# Patient Record
Sex: MALE | Race: WHITE | NOT HISPANIC OR LATINO | Employment: OTHER | URBAN - METROPOLITAN AREA
[De-identification: names, ages, dates, MRNs, and addresses within clinical notes are randomized per-mention and may not be internally consistent; named-entity substitution may affect disease eponyms.]

---

## 2017-12-21 ENCOUNTER — TRANSCRIBE ORDERS (OUTPATIENT)
Dept: ADMINISTRATIVE | Facility: HOSPITAL | Age: 52
End: 2017-12-21

## 2017-12-21 DIAGNOSIS — K74.60 CIRRHOSIS OF LIVER WITHOUT ASCITES, UNSPECIFIED HEPATIC CIRRHOSIS TYPE (HCC): ICD-10-CM

## 2017-12-21 DIAGNOSIS — R16.0 HEPATOMEGALY: Primary | ICD-10-CM

## 2019-04-04 ENCOUNTER — HOSPITAL ENCOUNTER (EMERGENCY)
Facility: HOSPITAL | Age: 54
Discharge: HOME | End: 2019-04-04
Attending: EMERGENCY MEDICINE | Admitting: EMERGENCY MEDICINE
Payer: COMMERCIAL

## 2019-04-04 ENCOUNTER — APPOINTMENT (EMERGENCY)
Dept: RADIOLOGY | Facility: HOSPITAL | Age: 54
End: 2019-04-04
Payer: COMMERCIAL

## 2019-04-04 VITALS
HEART RATE: 99 BPM | BODY MASS INDEX: 25.77 KG/M2 | RESPIRATION RATE: 20 BRPM | HEIGHT: 70 IN | WEIGHT: 180 LBS | TEMPERATURE: 97.8 F | OXYGEN SATURATION: 98 % | DIASTOLIC BLOOD PRESSURE: 82 MMHG | SYSTOLIC BLOOD PRESSURE: 117 MMHG

## 2019-04-04 DIAGNOSIS — S40.011A CONTUSION OF RIGHT SHOULDER, INITIAL ENCOUNTER: Primary | ICD-10-CM

## 2019-04-04 PROCEDURE — 99283 EMERGENCY DEPT VISIT LOW MDM: CPT | Performed by: PHYSICIAN ASSISTANT

## 2019-04-04 PROCEDURE — 99283 EMERGENCY DEPT VISIT LOW MDM: CPT

## 2019-04-04 PROCEDURE — 73030 X-RAY EXAM OF SHOULDER: CPT

## 2019-04-04 PROCEDURE — 96372 THER/PROPH/DIAG INJ SC/IM: CPT

## 2019-04-04 RX ORDER — IBUPROFEN 600 MG/1
600 TABLET ORAL EVERY 6 HOURS PRN
Qty: 30 TABLET | Refills: 0 | Status: SHIPPED | OUTPATIENT
Start: 2019-04-04 | End: 2019-09-10 | Stop reason: ALTCHOICE

## 2019-04-04 RX ORDER — KETOROLAC TROMETHAMINE 30 MG/ML
15 INJECTION, SOLUTION INTRAMUSCULAR; INTRAVENOUS ONCE
Status: COMPLETED | OUTPATIENT
Start: 2019-04-04 | End: 2019-04-04

## 2019-04-04 RX ADMIN — KETOROLAC TROMETHAMINE 15 MG: 30 INJECTION, SOLUTION INTRAMUSCULAR; INTRAVENOUS at 13:06

## 2019-09-10 ENCOUNTER — HOSPITAL ENCOUNTER (EMERGENCY)
Facility: HOSPITAL | Age: 54
Discharge: HOME | End: 2019-09-10
Attending: EMERGENCY MEDICINE | Admitting: EMERGENCY MEDICINE
Payer: COMMERCIAL

## 2019-09-10 ENCOUNTER — APPOINTMENT (EMERGENCY)
Dept: RADIOLOGY | Facility: HOSPITAL | Age: 54
End: 2019-09-10
Payer: COMMERCIAL

## 2019-09-10 VITALS
TEMPERATURE: 97.6 F | OXYGEN SATURATION: 99 % | HEART RATE: 95 BPM | WEIGHT: 150 LBS | DIASTOLIC BLOOD PRESSURE: 77 MMHG | SYSTOLIC BLOOD PRESSURE: 122 MMHG | BODY MASS INDEX: 21.52 KG/M2 | RESPIRATION RATE: 15 BRPM

## 2019-09-10 DIAGNOSIS — R00.0 TACHYCARDIA: ICD-10-CM

## 2019-09-10 DIAGNOSIS — R06.00 DYSPNEA: Primary | ICD-10-CM

## 2019-09-10 DIAGNOSIS — M79.10 MYALGIA: ICD-10-CM

## 2019-09-10 DIAGNOSIS — F19.10 SUBSTANCE ABUSE (HCC): ICD-10-CM

## 2019-09-10 LAB
ALBUMIN SERPL BCP-MCNC: 4.2 G/DL (ref 3.5–5)
ALP SERPL-CCNC: 109 U/L (ref 46–116)
ALT SERPL W P-5'-P-CCNC: 35 U/L (ref 12–78)
ANION GAP SERPL CALCULATED.3IONS-SCNC: 8 MMOL/L (ref 4–13)
APTT PPP: 32 SECONDS (ref 23–37)
AST SERPL W P-5'-P-CCNC: 38 U/L (ref 5–45)
ATRIAL RATE: 106 BPM
BASOPHILS # BLD AUTO: 0.03 THOUSANDS/ΜL (ref 0–0.1)
BASOPHILS NFR BLD AUTO: 1 % (ref 0–1)
BILIRUB SERPL-MCNC: 1.4 MG/DL (ref 0.2–1)
BUN SERPL-MCNC: 17 MG/DL (ref 5–25)
CALCIUM SERPL-MCNC: 9.4 MG/DL (ref 8.3–10.1)
CHLORIDE SERPL-SCNC: 101 MMOL/L (ref 100–108)
CO2 SERPL-SCNC: 31 MMOL/L (ref 21–32)
CREAT SERPL-MCNC: 1.11 MG/DL (ref 0.6–1.3)
EOSINOPHIL # BLD AUTO: 0.13 THOUSAND/ΜL (ref 0–0.61)
EOSINOPHIL NFR BLD AUTO: 2 % (ref 0–6)
ERYTHROCYTE [DISTWIDTH] IN BLOOD BY AUTOMATED COUNT: 11.9 % (ref 11.6–15.1)
ETHANOL SERPL-MCNC: <3 MG/DL (ref 0–3)
GFR SERPL CREATININE-BSD FRML MDRD: 74 ML/MIN/1.73SQ M
GLUCOSE SERPL-MCNC: 102 MG/DL (ref 65–140)
HCT VFR BLD AUTO: 46.7 % (ref 36.5–49.3)
HGB BLD-MCNC: 15.3 G/DL (ref 12–17)
IMM GRANULOCYTES # BLD AUTO: 0.01 THOUSAND/UL (ref 0–0.2)
IMM GRANULOCYTES NFR BLD AUTO: 0 % (ref 0–2)
INR PPP: 1.25 (ref 0.84–1.19)
LITHIUM SERPL-SCNC: <0.2 MMOL/L (ref 0.5–1)
LYMPHOCYTES # BLD AUTO: 1.5 THOUSANDS/ΜL (ref 0.6–4.47)
LYMPHOCYTES NFR BLD AUTO: 24 % (ref 14–44)
MCH RBC QN AUTO: 30.7 PG (ref 26.8–34.3)
MCHC RBC AUTO-ENTMCNC: 32.8 G/DL (ref 31.4–37.4)
MCV RBC AUTO: 94 FL (ref 82–98)
MONOCYTES # BLD AUTO: 1.16 THOUSAND/ΜL (ref 0.17–1.22)
MONOCYTES NFR BLD AUTO: 19 % (ref 4–12)
NEUTROPHILS # BLD AUTO: 3.45 THOUSANDS/ΜL (ref 1.85–7.62)
NEUTS SEG NFR BLD AUTO: 54 % (ref 43–75)
NRBC BLD AUTO-RTO: 0 /100 WBCS
NT-PROBNP SERPL-MCNC: 14 PG/ML
P AXIS: 62 DEGREES
PLATELET # BLD AUTO: 211 THOUSANDS/UL (ref 149–390)
PMV BLD AUTO: 10.2 FL (ref 8.9–12.7)
POTASSIUM SERPL-SCNC: 3.3 MMOL/L (ref 3.5–5.3)
PR INTERVAL: 162 MS
PROT SERPL-MCNC: 8.2 G/DL (ref 6.4–8.2)
PROTHROMBIN TIME: 15.4 SECONDS (ref 11.6–14.5)
QRS AXIS: 58 DEGREES
QRSD INTERVAL: 76 MS
QT INTERVAL: 334 MS
QTC INTERVAL: 443 MS
RBC # BLD AUTO: 4.99 MILLION/UL (ref 3.88–5.62)
SODIUM SERPL-SCNC: 140 MMOL/L (ref 136–145)
T WAVE AXIS: 55 DEGREES
TROPONIN I SERPL-MCNC: <0.02 NG/ML
VENTRICULAR RATE: 106 BPM
WBC # BLD AUTO: 6.28 THOUSAND/UL (ref 4.31–10.16)

## 2019-09-10 PROCEDURE — 85730 THROMBOPLASTIN TIME PARTIAL: CPT | Performed by: EMERGENCY MEDICINE

## 2019-09-10 PROCEDURE — 84484 ASSAY OF TROPONIN QUANT: CPT | Performed by: EMERGENCY MEDICINE

## 2019-09-10 PROCEDURE — 85025 COMPLETE CBC W/AUTO DIFF WBC: CPT | Performed by: EMERGENCY MEDICINE

## 2019-09-10 PROCEDURE — 36415 COLL VENOUS BLD VENIPUNCTURE: CPT | Performed by: EMERGENCY MEDICINE

## 2019-09-10 PROCEDURE — 93010 ELECTROCARDIOGRAM REPORT: CPT | Performed by: INTERNAL MEDICINE

## 2019-09-10 PROCEDURE — 93005 ELECTROCARDIOGRAM TRACING: CPT

## 2019-09-10 PROCEDURE — 80178 ASSAY OF LITHIUM: CPT | Performed by: EMERGENCY MEDICINE

## 2019-09-10 PROCEDURE — 94640 AIRWAY INHALATION TREATMENT: CPT

## 2019-09-10 PROCEDURE — 80320 DRUG SCREEN QUANTALCOHOLS: CPT | Performed by: EMERGENCY MEDICINE

## 2019-09-10 PROCEDURE — 96360 HYDRATION IV INFUSION INIT: CPT

## 2019-09-10 PROCEDURE — 99285 EMERGENCY DEPT VISIT HI MDM: CPT

## 2019-09-10 PROCEDURE — 83880 ASSAY OF NATRIURETIC PEPTIDE: CPT | Performed by: EMERGENCY MEDICINE

## 2019-09-10 PROCEDURE — 99285 EMERGENCY DEPT VISIT HI MDM: CPT | Performed by: EMERGENCY MEDICINE

## 2019-09-10 PROCEDURE — 71046 X-RAY EXAM CHEST 2 VIEWS: CPT

## 2019-09-10 PROCEDURE — 80053 COMPREHEN METABOLIC PANEL: CPT | Performed by: EMERGENCY MEDICINE

## 2019-09-10 PROCEDURE — 85610 PROTHROMBIN TIME: CPT | Performed by: EMERGENCY MEDICINE

## 2019-09-10 RX ORDER — ALBUTEROL SULFATE 90 UG/1
1-2 AEROSOL, METERED RESPIRATORY (INHALATION) EVERY 6 HOURS PRN
Qty: 1 INHALER | Refills: 0 | Status: SHIPPED | OUTPATIENT
Start: 2019-09-10

## 2019-09-10 RX ORDER — SODIUM CHLORIDE FOR INHALATION 0.9 %
VIAL, NEBULIZER (ML) INHALATION
Status: DISCONTINUED
Start: 2019-09-10 | End: 2019-09-10 | Stop reason: HOSPADM

## 2019-09-10 RX ORDER — POTASSIUM CHLORIDE 20 MEQ/1
20 TABLET, EXTENDED RELEASE ORAL ONCE
Status: COMPLETED | OUTPATIENT
Start: 2019-09-10 | End: 2019-09-10

## 2019-09-10 RX ORDER — IPRATROPIUM BROMIDE AND ALBUTEROL SULFATE 2.5; .5 MG/3ML; MG/3ML
3 SOLUTION RESPIRATORY (INHALATION)
Status: DISCONTINUED | OUTPATIENT
Start: 2019-09-10 | End: 2019-09-10 | Stop reason: HOSPADM

## 2019-09-10 RX ADMIN — POTASSIUM CHLORIDE 20 MEQ: 20 TABLET, EXTENDED RELEASE ORAL at 05:34

## 2019-09-10 RX ADMIN — SODIUM CHLORIDE 1000 ML: 0.9 INJECTION, SOLUTION INTRAVENOUS at 05:04

## 2019-09-10 RX ADMIN — IPRATROPIUM BROMIDE AND ALBUTEROL SULFATE 3 ML: 2.5; .5 SOLUTION RESPIRATORY (INHALATION) at 05:03

## 2019-09-10 NOTE — ED NOTES
Lungs clear to auscultation; respirations easy and non-labored; speaking without any difficulty  Feeling better       Lora Neil RN  09/10/19 6166
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
## 2019-09-10 NOTE — ED NOTES
Patient transported to [facility], [address] [place] [address]  09/10/19 0600

## 2019-09-10 NOTE — ED NOTES
BCares packet given  Pt not interested in speaking the [address] representative here in ED; name given to Terri Reinoso, JESSICA  09/10/19 7769

## 2022-11-22 ENCOUNTER — TELEPHONE (OUTPATIENT)
Dept: PSYCHIATRY | Facility: CLINIC | Age: 57
End: 2022-11-22

## 2022-11-22 NOTE — TELEPHONE ENCOUNTER
Pt needs to cookie follow up for medication refill   Tried to call number rang twice and went to busy signal

## 2022-11-28 DIAGNOSIS — F31.60 BIPOLAR I DISORDER, MOST RECENT EPISODE MIXED (HCC): Primary | ICD-10-CM

## 2022-11-28 RX ORDER — TRAZODONE HYDROCHLORIDE 100 MG/1
100 TABLET ORAL
Qty: 36 TABLET | Refills: 0 | Status: SHIPPED | OUTPATIENT
Start: 2022-11-28

## 2022-11-28 RX ORDER — MIRTAZAPINE 30 MG/1
30 TABLET, FILM COATED ORAL
Qty: 18 TABLET | Refills: 0 | Status: SHIPPED | OUTPATIENT
Start: 2022-11-28

## 2022-11-28 NOTE — TELEPHONE ENCOUNTER
Medication Refill Request     Name of Medication Trazodone  Dose/Frequency 100mg 1-2 po qhs  Quantity 36  Verified pharmacy   [x]  Verified ordering Provider   [x]  Does patient have enough for the next 3 days? Yes [] No [x]  Does patient have a follow-up appointment scheduled? Yes [x] No []   If so when is appointment: 12/15/2022  Medication Refill Request     Name of Medication mirtazapine  Dose/Frequency 30mg qhs  Quantity 18  Verified pharmacy   [x]  Verified ordering Provider   [x]  Does patient have enough for the next 3 days? Yes [] No [x]  Does patient have a follow-up appointment scheduled?  Yes [x] No []   If so when is appointment: 12/15/2022

## 2022-12-13 PROBLEM — F10.20 ALCOHOLISM (HCC): Status: ACTIVE | Noted: 2022-12-13

## 2022-12-13 PROBLEM — F14.20 MODERATE COCAINE USE DISORDER (HCC): Status: ACTIVE | Noted: 2022-12-13

## 2022-12-13 PROBLEM — F31.60 BIPOLAR I DISORDER, MOST RECENT EPISODE MIXED (HCC): Status: ACTIVE | Noted: 2022-12-13

## 2023-01-19 ENCOUNTER — TELEPHONE (OUTPATIENT)
Dept: PSYCHIATRY | Facility: CLINIC | Age: 58
End: 2023-01-19

## 2023-01-19 NOTE — TELEPHONE ENCOUNTER
Outreach call attempted to schedule client with Norma Yeboah, [address]  Unable to connect - phone has calling restrictions  Email message sent to client as well

## 2023-02-02 ENCOUNTER — TELEPHONE (OUTPATIENT)
Dept: GASTROENTEROLOGY | Facility: AMBULARY SURGERY CENTER | Age: 58
End: 2023-02-02

## 2023-02-02 NOTE — TELEPHONE ENCOUNTER
Patients GI provider:  Dr Rudolph Malone     Number to return call: [phone]    Reason for call: Pt calling requesting for an appt but there's no available appt   Patient stated he is going to the ED due to severe pain and would like to see Dr Rudolph Malone at the hospital      Please call patient for any cancelation    Scheduled procedure/appointment date if applicable: Apt/procedure no availability

## 2023-02-03 DIAGNOSIS — F31.60 BIPOLAR I DISORDER, MOST RECENT EPISODE MIXED (HCC): ICD-10-CM

## 2023-02-03 RX ORDER — TRAZODONE HYDROCHLORIDE 100 MG/1
100 TABLET ORAL
Qty: 30 TABLET | Refills: 0 | Status: CANCELLED | OUTPATIENT
Start: 2023-02-03

## 2023-02-03 RX ORDER — MIRTAZAPINE 30 MG/1
30 TABLET, FILM COATED ORAL
Qty: 30 TABLET | Refills: 0 | Status: CANCELLED | OUTPATIENT
Start: 2023-02-03

## 2023-02-03 RX ORDER — MIRTAZAPINE 30 MG/1
30 TABLET, FILM COATED ORAL
Qty: 30 TABLET | Refills: 0 | Status: SHIPPED | OUTPATIENT
Start: 2023-02-03 | End: 2023-04-26 | Stop reason: SDUPTHER

## 2023-02-03 RX ORDER — TRAZODONE HYDROCHLORIDE 100 MG/1
100 TABLET ORAL
Qty: 30 TABLET | Refills: 0 | Status: SHIPPED | OUTPATIENT
Start: 2023-02-03 | End: 2023-04-26 | Stop reason: SDUPTHER

## 2023-02-03 NOTE — TELEPHONE ENCOUNTER
Tried to call the patient to offer a Dr Johnathon Charlton appt today, 02/03 at 3pm but phone says due to calling restrictions I couldn't leave a message

## 2023-02-03 NOTE — TELEPHONE ENCOUNTER
Patient calling for RF of mirtazapine and trazodone  Scheduled pt for 3/23   Forwarding RF for approval

## 2023-02-04 ENCOUNTER — HOSPITAL ENCOUNTER (EMERGENCY)
Facility: HOSPITAL | Age: 58
Discharge: HOME | End: 2023-02-04
Attending: EMERGENCY MEDICINE

## 2023-02-04 ENCOUNTER — APPOINTMENT (EMERGENCY)
Dept: CT IMAGING | Facility: HOSPITAL | Age: 58
End: 2023-02-04

## 2023-02-04 VITALS
BODY MASS INDEX: 24.34 KG/M2 | WEIGHT: 170 LBS | SYSTOLIC BLOOD PRESSURE: 123 MMHG | HEART RATE: 89 BPM | DIASTOLIC BLOOD PRESSURE: 82 MMHG | RESPIRATION RATE: 18 BRPM | HEIGHT: 70 IN | OXYGEN SATURATION: 98 % | TEMPERATURE: 97.7 F

## 2023-02-04 DIAGNOSIS — K74.60 CIRRHOSIS (HCC): ICD-10-CM

## 2023-02-04 DIAGNOSIS — R10.9 ABDOMINAL PAIN: Primary | ICD-10-CM

## 2023-02-04 LAB
ALBUMIN SERPL BCP-MCNC: 3.9 G/DL (ref 3.5–5)
ALP SERPL-CCNC: 171 U/L (ref 46–116)
ALT SERPL W P-5'-P-CCNC: 30 U/L (ref 12–78)
ANION GAP SERPL CALCULATED.3IONS-SCNC: 8 MMOL/L (ref 4–13)
AST SERPL W P-5'-P-CCNC: 32 U/L (ref 5–45)
BASOPHILS # BLD AUTO: 0.03 THOUSANDS/ÂΜL (ref 0–0.1)
BASOPHILS NFR BLD AUTO: 0 % (ref 0–1)
BILIRUB SERPL-MCNC: 0.8 MG/DL (ref 0.2–1)
BILIRUB UR QL STRIP: NEGATIVE
BUN SERPL-MCNC: 11 MG/DL (ref 5–25)
CALCIUM SERPL-MCNC: 9.5 MG/DL (ref 8.3–10.1)
CHLORIDE SERPL-SCNC: 100 MMOL/L (ref 96–108)
CLARITY UR: CLEAR
CO2 SERPL-SCNC: 25 MMOL/L (ref 21–32)
COLOR UR: YELLOW
CREAT SERPL-MCNC: 1.02 MG/DL (ref 0.6–1.3)
EOSINOPHIL # BLD AUTO: 0.07 THOUSAND/ÂΜL (ref 0–0.61)
EOSINOPHIL NFR BLD AUTO: 1 % (ref 0–6)
ERYTHROCYTE [DISTWIDTH] IN BLOOD BY AUTOMATED COUNT: 11.7 % (ref 11.6–15.1)
GFR SERPL CREATININE-BSD FRML MDRD: 80 ML/MIN/1.73SQ M
GLUCOSE SERPL-MCNC: 116 MG/DL (ref 65–140)
GLUCOSE UR STRIP-MCNC: NEGATIVE MG/DL
HCT VFR BLD AUTO: 49.4 % (ref 36.5–49.3)
HGB BLD-MCNC: 16.2 G/DL (ref 12–17)
HGB UR QL STRIP.AUTO: NEGATIVE
IMM GRANULOCYTES # BLD AUTO: 0.04 THOUSAND/UL (ref 0–0.2)
IMM GRANULOCYTES NFR BLD AUTO: 0 % (ref 0–2)
KETONES UR STRIP-MCNC: NEGATIVE MG/DL
LEUKOCYTE ESTERASE UR QL STRIP: NEGATIVE
LIPASE SERPL-CCNC: 73 U/L (ref 73–393)
LYMPHOCYTES # BLD AUTO: 1.42 THOUSANDS/ÂΜL (ref 0.6–4.47)
LYMPHOCYTES NFR BLD AUTO: 14 % (ref 14–44)
MCH RBC QN AUTO: 29.4 PG (ref 26.8–34.3)
MCHC RBC AUTO-ENTMCNC: 32.8 G/DL (ref 31.4–37.4)
MCV RBC AUTO: 90 FL (ref 82–98)
MONOCYTES # BLD AUTO: 0.92 THOUSAND/ÂΜL (ref 0.17–1.22)
MONOCYTES NFR BLD AUTO: 9 % (ref 4–12)
NEUTROPHILS # BLD AUTO: 8 THOUSANDS/ÂΜL (ref 1.85–7.62)
NEUTS SEG NFR BLD AUTO: 76 % (ref 43–75)
NITRITE UR QL STRIP: NEGATIVE
NRBC BLD AUTO-RTO: 0 /100 WBCS
PH UR STRIP.AUTO: 5.5 [PH]
PLATELET # BLD AUTO: 241 THOUSANDS/UL (ref 149–390)
PMV BLD AUTO: 9.3 FL (ref 8.9–12.7)
POTASSIUM SERPL-SCNC: 4.4 MMOL/L (ref 3.5–5.3)
PROT SERPL-MCNC: 7.7 G/DL (ref 6.4–8.4)
PROT UR STRIP-MCNC: NEGATIVE MG/DL
RBC # BLD AUTO: 5.51 MILLION/UL (ref 3.88–5.62)
SODIUM SERPL-SCNC: 133 MMOL/L (ref 135–147)
SP GR UR STRIP.AUTO: 1.02 (ref 1–1.03)
UROBILINOGEN UR STRIP-ACNC: <2 MG/DL
WBC # BLD AUTO: 10.48 THOUSAND/UL (ref 4.31–10.16)

## 2023-02-04 RX ORDER — ONDANSETRON 4 MG/1
4 TABLET, ORALLY DISINTEGRATING ORAL EVERY 6 HOURS PRN
Qty: 20 TABLET | Refills: 0 | Status: SHIPPED | OUTPATIENT
Start: 2023-02-04

## 2023-02-04 RX ORDER — KETOROLAC TROMETHAMINE 30 MG/ML
15 INJECTION, SOLUTION INTRAMUSCULAR; INTRAVENOUS ONCE
Status: COMPLETED | OUTPATIENT
Start: 2023-02-04 | End: 2023-02-04

## 2023-02-04 RX ORDER — FAMOTIDINE 20 MG/1
20 TABLET, FILM COATED ORAL 2 TIMES DAILY
Qty: 30 TABLET | Refills: 0 | Status: SHIPPED | OUTPATIENT
Start: 2023-02-04

## 2023-02-04 RX ORDER — ONDANSETRON 2 MG/ML
4 INJECTION INTRAMUSCULAR; INTRAVENOUS ONCE
Status: COMPLETED | OUTPATIENT
Start: 2023-02-04 | End: 2023-02-04

## 2023-02-04 RX ADMIN — KETOROLAC TROMETHAMINE 15 MG: 30 INJECTION, SOLUTION INTRAMUSCULAR; INTRAVENOUS at 05:09

## 2023-02-04 RX ADMIN — SODIUM CHLORIDE 1000 ML: 0.9 INJECTION, SOLUTION INTRAVENOUS at 05:09

## 2023-02-04 RX ADMIN — IOHEXOL 100 ML: 350 INJECTION, SOLUTION INTRAVENOUS at 05:48

## 2023-02-04 RX ADMIN — ONDANSETRON 4 MG: 2 INJECTION INTRAMUSCULAR; INTRAVENOUS at 05:09

## 2023-02-04 RX ADMIN — MORPHINE SULFATE 2 MG: 2 INJECTION, SOLUTION INTRAMUSCULAR; INTRAVENOUS at 06:12

## 2023-02-04 NOTE — ED PROVIDER NOTES
History  Chief Complaint   Patient presents with   • Abdominal Pain     Lower abdominal pain x3 days, denies n/v/d//fever; "thinks it's my kidneys", no kidney hx     60 yo M presents with gradually worsening lower abd pain  Mild for the past month, worse over the past few days  No similar in past  H/o ez  No other abd surgery  Mild nausea  No vomiting  No urinary sx  No diarrhea  No fevers  No cp/sob  Pt denied any medical problems, smoking/drinking  Admits to marijuana  Review of records shows PMH of bipolar, cirrhosis, COPD, and ETOH/IVDU  History provided by:  Patient and medical records   used: No    Abdominal Pain  Pain location:  Periumbilical, RLQ and LLQ  Pain quality: aching    Pain radiates to:  Does not radiate  Pain severity:  Moderate  Onset quality:  Gradual  Timing:  Constant  Progression:  Worsening  Chronicity:  New  Relieved by:  Nothing  Worsened by:  Nothing  Ineffective treatments:  None tried  Associated symptoms: nausea    Associated symptoms: no chest pain, no chills, no constipation, no cough, no diarrhea, no dysuria, no fatigue, no fever, no hematemesis, no hematochezia, no hematuria, no shortness of breath, no sore throat and no vomiting        Prior to Admission Medications   Prescriptions Last Dose Informant Patient Reported? Taking?    ARIPiprazole (ABILIFY) 10 mg tablet   Yes No   Sig: Take 10 mg by mouth daily Take 1 PO Q HS   albuterol (PROVENTIL HFA,VENTOLIN HFA) 90 mcg/act inhaler   No No   Sig: Inhale 1-2 puffs every 6 (six) hours as needed for wheezing   mirtazapine (REMERON) 30 mg tablet   No No   Sig: Take 1 tablet (30 mg total) by mouth daily at bedtime   traZODone (DESYREL) 100 mg tablet   No No   Sig: Take 1 tablet (100 mg total) by mouth daily at bedtime as needed for sleep      Facility-Administered Medications: None       Past Medical History:   Diagnosis Date   • Schwartz's esophagus    • Bipolar disorder (HCC)    • [facility]osis ([facility] )    • COPD (chronic obstructive pulmonary disease) (HCC)    • Hepatitis C    • Psoriasis    • Psychiatric disorder     bipolar   • [facility] ([facility] )    • Salmonella    • Substance abuse ([facility] )     ETOH and IVDA       Past Surgical History:   Procedure Laterality Date   • ANKLE SURGERY     • CARPAL TUNNEL RELEASE     • CHOLECYSTECTOMY     • COLONOSCOPY  10/31/2017   • EGD  09/12/2014       Family History   Problem Relation Age of Onset   • Lung cancer Mother    • Parkinsonism Father    • Hepatitis Brother         C     I have reviewed and agree with the history as documented  E-Cigarette/Vaping   • E-Cigarette Use Never User      E-Cigarette/Vaping Substances     Social History     Tobacco Use   • Smoking status: Former   • Smokeless tobacco: Never   Vaping Use   • Vaping Use: Never used   Substance Use Topics   • Alcohol use: Not Currently     Comment: Beer   • Drug use: Yes     Types: Methamphetamines, Marijuana, Cocaine       Review of Systems   Constitutional: Negative for chills, diaphoresis, fatigue, fever and unexpected weight change  HENT: Negative for congestion, ear pain, rhinorrhea, sore throat, trouble swallowing and voice change  Eyes: Negative for pain and visual disturbance  Respiratory: Negative for cough, chest tightness and shortness of breath  Cardiovascular: Negative for chest pain, palpitations and leg swelling  Gastrointestinal: Positive for abdominal pain and nausea  Negative for blood in stool, constipation, diarrhea, hematemesis, hematochezia and vomiting  Genitourinary: Negative for difficulty urinating, dysuria, flank pain, frequency and hematuria  Musculoskeletal: Negative for arthralgias, back pain and neck pain  Skin: Negative for rash  Neurological: Negative for dizziness, syncope, light-headedness and headaches  Psychiatric/Behavioral: Negative for confusion and suicidal ideas  The patient is not nervous/anxious          Physical Exam  Physical Exam  Vitals and nursing note reviewed  Constitutional:       General: He is not in acute distress  Appearance: He is well-developed  He is not ill-appearing, toxic-appearing or diaphoretic  HENT:      Head: Normocephalic and atraumatic  Right Ear: External ear normal       Left Ear: External ear normal       Nose: Nose normal    Eyes:      General: No scleral icterus  Right eye: No discharge  Left eye: No discharge  Conjunctiva/sclera: Conjunctivae normal       Pupils: Pupils are equal, round, and reactive to light  Neck:      Vascular: No JVD  Trachea: No tracheal deviation  Cardiovascular:      Rate and Rhythm: Normal rate and regular rhythm  Heart sounds: Normal heart sounds  No murmur heard  No friction rub  No gallop  Pulmonary:      Effort: Pulmonary effort is normal  No respiratory distress  Breath sounds: Normal breath sounds  No stridor  No wheezing or rales  Chest:      Chest wall: No tenderness  Abdominal:      General: Bowel sounds are normal  There is no distension  Palpations: Abdomen is soft  Tenderness: There is abdominal tenderness in the right lower quadrant, suprapubic area and left lower quadrant  There is no right CVA tenderness, left CVA tenderness, guarding or rebound  Hernia: No hernia is present  Musculoskeletal:         General: No tenderness or deformity  Normal range of motion  Cervical back: Normal range of motion and neck supple  Lymphadenopathy:      Cervical: No cervical adenopathy  Skin:     General: Skin is warm and dry  Findings: No rash  Neurological:      Mental Status: He is alert and oriented to person, place, and time  Cranial Nerves: No cranial nerve deficit  Sensory: No sensory deficit        Coordination: Coordination normal    Psychiatric:         Behavior: Behavior normal          Vital Signs  ED Triage Vitals [02/04/23 0411]   Temperature Pulse Respirations Blood Pressure SpO2   97 7 °F (36 5 °C) 89 18 123/82 98 %      Temp Source Heart Rate Source Patient Position - Orthostatic VS BP Location FiO2 (%)   Temporal Monitor Sitting Left arm --      Pain Score       10 - Worst Possible Pain           Vitals:    02/04/23 0411   BP: 123/82   Pulse: 89   Patient Position - Orthostatic VS: Sitting         Visual Acuity      ED Medications  Medications   sodium chloride 0 9 % bolus 1,000 mL (0 mL Intravenous Stopped 2/4/23 0609)   ondansetron (ZOFRAN) injection 4 mg (4 mg Intravenous Given 2/4/23 0509)   ketorolac (TORADOL) injection 15 mg (15 mg Intravenous Given 2/4/23 0509)   iohexol (OMNIPAQUE) 350 MG/ML injection (SINGLE-DOSE) 100 mL (100 mL Intravenous Given 2/4/23 0548)   morphine injection 2 mg (2 mg Intravenous Given 2/4/23 0612)       Diagnostic Studies  Results Reviewed     Procedure Component Value Units Date/Time    UA w Reflex to Microscopic w Reflex to Culture [[id number]] Collected: 02/04/23 0530    Lab Status: Final result Specimen: Urine, Clean Catch Updated: 02/04/23 0530     Color, UA Yellow     Clarity, UA Clear     Specific Stanton, UA 1 020     pH, UA 5 5     Leukocytes, UA Negative     Nitrite, UA Negative     Protein, UA Negative mg/dl      Glucose, UA Negative mg/dl      Ketones, UA Negative mg/dl      Urobilinogen, UA <2 0 mg/dl      Bilirubin, UA Negative     Occult Blood, UA Negative    Comprehensive metabolic panel [[id number]]  (Abnormal) Collected: 02/04/23 0452    Lab Status: Final result Specimen: Blood from Arm, Left Updated: 02/04/23 0530     Sodium 133 mmol/L      Potassium 4 4 mmol/L      Chloride 100 mmol/L      CO2 25 mmol/L      ANION GAP 8 mmol/L      BUN 11 mg/dL      Creatinine 1 02 mg/dL      Glucose 116 mg/dL      Calcium 9 5 mg/dL      AST 32 U/L      ALT 30 U/L      Alkaline Phosphatase 171 U/L      Total Protein 7 7 g/dL      Albumin 3 9 g/dL      Total Bilirubin 0 80 mg/dL      eGFR 80 ml/min/1 73sq m     Narrative:      [place] guidelines for Chronic Kidney Disease (CKD):   •  Stage 1 with normal or high GFR (GFR > 90 mL/min/1 73 square meters)  •  Stage 2 Mild CKD (GFR = 60-89 mL/min/1 73 square meters)  •  Stage 3A Moderate CKD (GFR = 45-59 mL/min/1 73 square meters)  •  Stage 3B Moderate CKD (GFR = 30-44 mL/min/1 73 square meters)  •  Stage 4 Severe CKD (GFR = 15-29 mL/min/1 73 square meters)  •  Stage 5 End Stage CKD (GFR <15 mL/min/1 73 square meters)  Note: GFR calculation is accurate only with a steady state creatinine    Lipase [[id number]]  (Normal) Collected: 02/04/23 0452    Lab Status: Final result Specimen: Blood from Arm, Left Updated: 02/04/23 0530     Lipase 73 u/L     CBC and differential [[id number]]  (Abnormal) Collected: 02/04/23 0452    Lab Status: Final result Specimen: Blood from Arm, Left Updated: 02/04/23 0510     WBC 10 48 Thousand/uL      RBC 5 51 Million/uL      Hemoglobin 16 2 g/dL      Hematocrit 49 4 %      MCV 90 fL      MCH 29 4 pg      MCHC 32 8 g/dL      RDW 11 7 %      MPV 9 3 fL      Platelets 491 Thousands/uL      nRBC 0 /100 WBCs      Neutrophils Relative 76 %      Immat GRANS % 0 %      Lymphocytes Relative 14 %      Monocytes Relative 9 %      Eosinophils Relative 1 %      Basophils Relative 0 %      Neutrophils Absolute 8 00 Thousands/µL      Immature Grans Absolute 0 04 Thousand/uL      Lymphocytes Absolute 1 42 Thousands/µL      Monocytes Absolute 0 92 Thousand/µL      Eosinophils Absolute 0 07 Thousand/µL      Basophils Absolute 0 03 Thousands/µL                  CT abdomen pelvis with contrast   Final Result by Echo Gan DO (02/04 7029)      Partially distended bladder  Mild circumferential bladder wall thickening noted, probably exaggerated by underdistention  Superimposed cystitis considered in the appropriate clinical setting  Correlation with the patient's symptoms, laboratory values,    and urinalysis recommended        Hepatic cirrhosis, colonic diverticulosis without evidence of acute diverticulitis, and other findings as above  Workstation performed: [id number]                    Procedures  Procedures         ED Course  ED Course as of 02/04/23 0478   Sat Feb 04, 2023   9757 CT noted  No acute findings  Will d/c home  F/u with PCP  MDM    Disposition  Final diagnoses:   Abdominal pain   Cirrhosis ([address] )     Time reflects when diagnosis was documented in both MDM as applicable and the Disposition within this note     Time User Action Codes Description Comment    2/4/2023  7:11 AM Dewayne KENNEDY Add [R10 9] Abdominal pain     2/4/2023  7:11 AM Rony Strauss Add [K74 60] Cirrhosis [facility])       ED Disposition     ED Disposition   Discharge    Condition   Stable    Date/Time   Sat Feb 4, 2023  7:11 AM    Comment   Andres Hollingsworth discharge to home/self care                 Follow-up Information     Follow up With Specialties Details Why Contact Info Additional Information    Kwan Neal MD Internal Medicine Schedule an appointment as soon as possible for a visit   [address] [facility] [phone]        [address] Emergency Department Emergency Medicine  If symptoms worsen [address] [place],D [address]  [address] [facility] Emergency Department, [address] [facility] [address]    [address] [facility] Schedule an appointment as soon as possible for a visit   [address] [place] [address]  700 [facility] [address], [address], [facility] [address] [facility]     [phone]          Patient's Medications   Discharge Prescriptions    FAMOTIDINE (PEPCID) 20 MG TABLET    Take 1 tablet (20 mg total) by mouth 2 (two) times a day       Start Date: 2/4/2023  End Date: --       Order Dose: 20 mg       Quantity: 30 tablet    Refills: 0    ONDANSETRON (ZOFRAN-ODT) 4 MG DISINTEGRATING TABLET    Take 1 tablet (4 mg total) by mouth every 6 (six) hours as needed for vomiting or nausea       Start Date: 2/4/2023  End Date: --       Order Dose: 4 mg       Quantity: 20 tablet    Refills: 0       No discharge procedures on file      PDMP Review     None          ED Provider  Electronically Signed by           Tate Beltran MD  02/04/23 570

## 2023-02-06 NOTE — TELEPHONE ENCOUNTER
Tried to call and leave the patient a message about scheduling with Dr Wenceslao Rodriguez today, 02/06, but phone says due to calling restrictions that my call could not be completed so was unable to leave a message for the patient

## 2023-03-18 ENCOUNTER — HOSPITAL ENCOUNTER (EMERGENCY)
Facility: HOSPITAL | Age: 58
Discharge: HOME | End: 2023-03-18
Attending: EMERGENCY MEDICINE

## 2023-03-18 VITALS
OXYGEN SATURATION: 97 % | TEMPERATURE: 98.3 F | RESPIRATION RATE: 20 BRPM | SYSTOLIC BLOOD PRESSURE: 135 MMHG | DIASTOLIC BLOOD PRESSURE: 88 MMHG | HEART RATE: 98 BPM

## 2023-03-18 DIAGNOSIS — H61.22 LEFT EAR IMPACTED CERUMEN: Primary | ICD-10-CM

## 2023-03-18 NOTE — DISCHARGE INSTRUCTIONS
Use an over-the-counter ear drop such as Debrox to help remove the remainder of the wax in your left ear    If symptoms are not improving, follow up with ENT or your primary care doctor

## 2023-03-18 NOTE — ED PROVIDER NOTES
History  Chief Complaint   Patient presents with   • Foreign Body in Ear     Pt feels like the foam piece of his ear bud is stuck in his ear x4 days  C/o decreased hearing and pain     59year old male presents for evaluation of the sensation that something is stuck in his left ear for the past 3 days  He states he had been wearing ear buds and later discovered that one of the silicone tips was missing  He has been having gradually worsening pain and decreasing hearing from the ear since  He tried over-the-counter eardrops for wax without improvement  No fevers or chills  No URI symptoms  Prior to Admission Medications   Prescriptions Last Dose Informant Patient Reported? Taking?    ARIPiprazole (ABILIFY) 10 mg tablet   Yes No   Sig: Take 10 mg by mouth daily Take 1 PO Q HS   albuterol (PROVENTIL HFA,VENTOLIN HFA) 90 mcg/act inhaler   No No   Sig: Inhale 1-2 puffs every 6 (six) hours as needed for wheezing   famotidine (PEPCID) 20 mg tablet   No No   Sig: Take 1 tablet (20 mg total) by mouth 2 (two) times a day   mirtazapine (REMERON) 30 mg tablet   No No   Sig: Take 1 tablet (30 mg total) by mouth daily at bedtime   ondansetron (ZOFRAN-ODT) 4 mg disintegrating tablet   No No   Sig: Take 1 tablet (4 mg total) by mouth every 6 (six) hours as needed for vomiting or nausea   traZODone (DESYREL) 100 mg tablet   No No   Sig: Take 1 tablet (100 mg total) by mouth daily at bedtime as needed for sleep      Facility-Administered Medications: None       Past Medical History:   Diagnosis Date   • Schwartz's esophagus    • Bipolar disorder (HCC)    • Cirrhosis (HCC)    • COPD (chronic obstructive pulmonary disease) (HCC)    • Hepatitis C    • Psoriasis    • Psychiatric disorder     bipolar   • Rheumatoid arthritis (HCC)    • Salmonella    • Substance abuse ([facility] )     ETOH and IVDA       Past Surgical History:   Procedure Laterality Date   • ANKLE SURGERY     • CARPAL TUNNEL RELEASE     • CHOLECYSTECTOMY     • COLONOSCOPY  10/31/2017   • EGD  09/12/2014       Family History   Problem Relation Age of Onset   • Lung cancer Mother    • Parkinsonism Father    • Hepatitis Brother         C     I have reviewed and agree with the history as documented  E-Cigarette/Vaping   • E-Cigarette Use Never User      E-Cigarette/Vaping Substances     Social History     Tobacco Use   • Smoking status: Former   • Smokeless tobacco: Never   Vaping Use   • Vaping Use: Never used   Substance Use Topics   • Alcohol use: Not Currently     Comment: Beer   • Drug use: Yes     Types: Methamphetamines, Marijuana, Cocaine       Review of Systems    Physical Exam  Physical Exam  Vitals and nursing note reviewed  HENT:      Head: Normocephalic and atraumatic  Right Ear: Tympanic membrane, ear canal and external ear normal       Left Ear: External ear normal  There is impacted cerumen  Neurological:      Mental Status: He is alert  Vital Signs  ED Triage Vitals [03/18/23 1133]   Temperature Pulse Respirations Blood Pressure SpO2   98 3 °F (36 8 °C) 98 20 135/88 97 %      Temp Source Heart Rate Source Patient Position - Orthostatic VS BP Location FiO2 (%)   Oral Monitor Sitting Left arm --      Pain Score       8           Vitals:    03/18/23 1133   BP: 135/88   Pulse: 98   Patient Position - Orthostatic VS: Sitting         Visual Acuity      ED Medications  Medications - No data to display    Diagnostic Studies  Results Reviewed     None                 No orders to display              Procedures  Ear cerumen removal    Date/Time: 3/18/2023 1:08 PM  Performed by: Yissel Ansari MD  Authorized by: Yissel Ansari MD   Universal Protocol:  Consent: Verbal consent obtained    Consent given by: patient  Required items: required blood products, implants, devices, and special equipment available  Patient identity confirmed: verbally with patient and arm band      Unsuccessful Attempt: unsuccessful attempt    Patient location:  ED  Procedure details:     Location:  L ear    Procedure type: curette      Approach:  External    Visualization (free text):  Direct    Equipment used:  Curette  Post-procedure details:     Complication:  Bleeding and TM perforation    Hearing quality:  Diminished    Patient tolerance of procedure: Tolerated well, no immediate complications  Comments:      Removal of small amount of earwax from the left ear; however, there remains additional wax deep into the canal  Slight improvement in symptoms after procedure  No bleeding  TM not able to be visualized  ED Course                                             Medical Decision Making  59year old male presents for evaluation of decreased hearing and pain of the left ear for 3 days associated with foreign body sensation  No foreign bodies present; however, there is impacted cerumen  Small amount of cerumen able to be removed with curette without complication  Patient advised to use over-the-counter drops for earwax, such as Debrox, for removal of remainder of wax as further attempts at manual removal would increase risk of TM perforation  Patient advised to avoid using Q-tips in the ear  ENT or PCP follow up if not improving  Return precautions provided  Left ear impacted cerumen: acute illness or injury      Disposition  Final diagnoses:   Left ear impacted cerumen     Time reflects when diagnosis was documented in both MDM as applicable and the Disposition within this note     Time User Action Codes Description Comment    3/18/2023  1:01 PM Myriam Flores Add [H61 22] Left ear impacted cerumen       ED Disposition     ED Disposition   Discharge    Condition   Stable    Date/Time   Sat Mar 18, 2023  1:01 PM    [address] discharge to home/self care                 Follow-up Information     Follow up With Specialties Details Why Contact Info Additional [address] Radha Arreola MD Internal Medicine  As needed [address], Nose and Throat Otolaryngology  As needed [address] [facility] [address], Nose and Throat, [address], [facility], [address] [place] [address] [address]     [address] Emergency Department Emergency Medicine Go to  If symptoms worsen [address] [place],9D [address]  [address] [facility] Emergency Department, [address] [place], [facility], [facility] [address]          Discharge Medication List as of 3/18/2023  1:03 PM      CONTINUE these medications which have NOT CHANGED    Details   albuterol (PROVENTIL HFA,VENTOLIN HFA) 90 mcg/act inhaler Inhale 1-2 puffs every 6 (six) hours as needed for wheezing, Starting Tue 9/10/2019, Print      ARIPiprazole (ABILIFY) 10 mg tablet Take 10 mg by mouth daily Take 1 PO Q HS, Historical Med      famotidine (PEPCID) 20 mg tablet Take 1 tablet (20 mg total) by mouth 2 (two) times a day, Starting Sat 2/4/2023, Normal      mirtazapine (REMERON) 30 mg tablet Take 1 tablet (30 mg total) by mouth daily at bedtime, Starting Fri 2/3/2023, Normal      ondansetron (ZOFRAN-ODT) 4 mg disintegrating tablet Take 1 tablet (4 mg total) by mouth every 6 (six) hours as needed for vomiting or nausea, Starting Sat 2/4/2023, Normal      traZODone (DESYREL) 100 mg tablet Take 1 tablet (100 mg total) by mouth daily at bedtime as needed for sleep, Starting Fri 2/3/2023, Normal             No discharge procedures on file      PDMP Review     None          ED Provider  Electronically Signed by           Lexy Tamayo MD  03/18/23 7619

## 2023-04-26 ENCOUNTER — TELEMEDICINE (OUTPATIENT)
Dept: PSYCHIATRY | Facility: CLINIC | Age: 58
End: 2023-04-26

## 2023-04-26 DIAGNOSIS — F31.60 BIPOLAR I DISORDER, MOST RECENT EPISODE MIXED (HCC): ICD-10-CM

## 2023-04-26 DIAGNOSIS — F10.20 ALCOHOLISM (HCC): ICD-10-CM

## 2023-04-26 DIAGNOSIS — F14.20: Primary | ICD-10-CM

## 2023-04-26 RX ORDER — ARIPIPRAZOLE 10 MG/1
TABLET ORAL
Qty: 90 TABLET | Refills: 1 | Status: SHIPPED | OUTPATIENT
Start: 2023-04-26

## 2023-04-26 RX ORDER — TRAZODONE HYDROCHLORIDE 100 MG/1
TABLET ORAL
Qty: 180 TABLET | Refills: 1 | Status: SHIPPED | OUTPATIENT
Start: 2023-04-26

## 2023-04-26 RX ORDER — MIRTAZAPINE 30 MG/1
TABLET, FILM COATED ORAL
Qty: 90 TABLET | Refills: 1 | Status: SHIPPED | OUTPATIENT
Start: 2023-04-26

## 2023-04-26 NOTE — BH TREATMENT PLAN
TREATMENT PLAN (Medication Management Only)        [facility]    Name and Date of Birth:  Stephenie Royal 59 y o  1965  Date of Treatment Plan: April 26, 2023  Diagnosis/Diagnoses:    1  Moderate cocaine use disorder ([facility] )    2  Alcoholism ([facility] )    3  Bipolar I disorder, most recent episode mixed [facility])      Strengths/Personal Resources for Self-Care: supportive family, supportive friends  Area/Areas of need (in own words): mood swings  1  Long Term Goal: maintain mood stability  Target Date:6 months - 10/26/2023  Person/Persons responsible for completion of goal: Juan Rincon  2  Short Term Objective (s) - How will we reach this goal?:   A  Provider new recommended medication/dosage changes and/or continue medication(s): continue current medications as prescribed Remeron, Trazodone, Abilify  B  N/A   C  N/A  Target Date:6 months - 10/26/2023  Person/Persons Responsible for Completion of Goal: Juan Rincon  Progress Towards Goals: continuing treatment  Treatment Modality: medication management every 3 months  Review due 180 days from date of this plan: 6 months - 10/26/2023  Expected length of service: maintenance  My Physician/PA/NP and I have developed this plan together and I agree to work on the goals and objectives  I understand the treatment goals that were developed for my treatment

## 2023-04-26 NOTE — PATIENT INSTRUCTIONS
Continue Current Tx  This was via phone  Give ACM Team # to call for assistance with Amwell  Report Problems  F/U with Medical Care  Return 3 months

## 2023-04-26 NOTE — PSYCH
Virtual Regular Visit    Verification of patient location:at home    Patient is located in the following state in which I hold an active license [place]      Assessment/Plan:       Diagnoses and all orders for this visit:    Moderate cocaine use disorder ([facility] )    Alcoholism ([facility] )    Bipolar I disorder, most recent episode mixed ([facility] )  -     traZODone (DESYREL) 100 mg tablet; Take 1-2 PO HS PRN  -     mirtazapine (REMERON) 30 mg tablet; Take 1 PO Q HS  -     ARIPiprazole (ABILIFY) 10 mg tablet; Take 1 PO Q HS          Goals addressed in session:   Good Health   Counseling provided:      Treatment Recommendations- Risks Benefits       Immediate Medical/Psychiatric/Psychotherapy Treatments and Any Precautions:     Risks, Benefits And Possible Side Effects Of Medications:  Risks, benefits, and possible side effects of medications explained to patient and patient verbalizes understanding    Controlled Medication Discussion: No records found for controlled prescriptions according to [facility]      Reason for visit is No chief complaint on file  Medication Management     Encounter provider PATTI Edwards    Provider [place] at [address] [place] [address]  [phone]      Recent Visits  No visits were found meeting these conditions  Showing recent visits within past 7 days and meeting all other requirements  Today's Visits  Date Type Provider Dept   04/26/23 Telemedicine [facility] today's visits and meeting all other requirements  Future Appointments  No visits were found meeting these conditions  Showing future appointments within next 150 days and meeting all other requirements       The patient was identified by name and date of birth   Eleanor Heard was informed that this is a telemedicine visit and that the visit is being conducted throughTelephone  My office door was closed  No one else was in the room  He acknowledged consent and understanding of privacy and security of the video platform  The patient has agreed to participate and understands they can discontinue the visit at any time  Patient is aware this is a billable service  Subjective    Kiara Ulrich is a 59 y o  male    Here today for a med check  This was via phone as he had a problem with Amwell    normal appetite      HPI  Has had some recent health issues   Has not had his meds as he could not get to the Pharm   Mood pretty depressed- cant go out and do things  Mood was good when taking meds   No problems with meds   Appetite and sleep good with his meds  Denies SI/HI    Past Medical History:   Diagnosis Date   • Schwartz's esophagus    • Bipolar disorder ([facility] )    • Cirrhosis ([facility] )    • COPD (chronic obstructive pulmonary disease) ([facility] )    • Hepatitis C    • Psoriasis    • Psychiatric disorder     bipolar   • Rheumatoid arthritis ([facility] )    • Salmonella    • Substance abuse ([place] [address] )     ETOH and IVDA       Past Surgical History:   Procedure Laterality Date   • ANKLE SURGERY     • CARPAL TUNNEL RELEASE     • CHOLECYSTECTOMY     • COLONOSCOPY  10/31/2017   • EGD  09/12/2014       Current Outpatient Medications   Medication Sig Dispense Refill   • ARIPiprazole (ABILIFY) 10 mg tablet Take 1 PO Q HS 90 tablet 1   • mirtazapine (REMERON) 30 mg tablet Take 1 PO Q HS 90 tablet 1   • traZODone (DESYREL) 100 mg tablet Take 1-2 PO HS  tablet 1   • albuterol (PROVENTIL HFA,VENTOLIN HFA) 90 mcg/act inhaler Inhale 1-2 puffs every 6 (six) hours as needed for wheezing 1 Inhaler 0   • famotidine (PEPCID) 20 mg tablet Take 1 tablet (20 mg total) by mouth 2 (two) times a day 30 tablet 0   • ondansetron (ZOFRAN-ODT) 4 mg disintegrating tablet Take 1 tablet (4 mg total) by mouth every 6 (six) hours as needed for vomiting or nausea 20 tablet 0     No current facility-administered medications for this visit  Allergies   Allergen Reactions   • Bee Venom        Social History     Substance and Sexual Activity   Drug Use Yes   • Types: Methamphetamines, Marijuana, Cocaine       Family History   Problem Relation Age of Onset   • Lung cancer Mother    • Parkinsonism Father    • Hepatitis Brother         C           Objective    Mental status:  Appearance calm and cooperative  and adequate hygiene and grooming   Mood mood appropriate   Affect affect appropriate    Speech a normal rate and fluent   Thought Processes normal thought processes   Hallucinations no hallucinations present    Thought Content no delusions   Abnormal Thoughts no suicidal thoughts  and no homicidal thoughts    Orientation  oriented to person and place and time   Remote Memory short term memory intact and long term memory intact   Attention Span concentration intact   Intellect Appears to be of Average Intelligence   Insight Insight intact   Judgement judgment was intact   Muscle Strength Muscle strength and tone were normal and Normal gait    Language no difficulty naming common objects   Fund of Knowledge displays adequate knowledge of current events   Pain none   Pain Scale 0       Video Exam    There were no vitals filed for this visit      I spent 15 minutes directly with the patient during this visit    Patient Instructions   Continue Current Tx  This was via phone  Give ACM Team # to call for assistance with Amwell  Report Problems  F/U with Medical Care  Return 3 months       Visit Time    Visit Start Time: 1500  Visit Stop Time: 1520  Total Visit Duration: 20 min

## 2023-06-17 ENCOUNTER — DOCUMENTATION (OUTPATIENT)
Dept: PSYCHIATRY | Facility: CLINIC | Age: 58
End: 2023-06-17

## 2023-06-17 NOTE — PSYCH
[facility]    Patient Name John Trinidad     Date of Birth: 59 y o  1965      MRN: [id number]    Admission Date: several months ago    Date of Transfer: June 17, 2023    Admission Diagnosis:     Bipolar I  Most recent mixed    Current Diagnosis:     No diagnosis found  Reason for Admission: Patric Bosch presented for treatment due to mood instability  Primary complaints included MOOD INSTABILITY SYMPTOMS: dysphoric mood  Progress in Treatment: Patric Bosch was seen for Medication Management  During the course of treatment he has been without his meds as he could not go to the Pharm  When on them he feels good  Episodes of Higher Level of Care: No    Transfer request Initiated by: Psychiatrist: Nurse Practitioner Jw Hutchinson Therapist: None    Reason for Transfer Request: clinician leaving practice    Does this individual need a clinician with specialized training/expertise?: No    Is this client working with any other [facility] Providers/Therapists?  Psychiatrist: None Therapist: None    Other pertinent issues: None    Are there any specific individuals who would be a “best fit” or who have already agreed to accept this transfer request?      Psychiatrist: None   Therapist: None  Rationale: Not Applicable    Attempts to maintain the current therapeutic relationship: No    Transfer request routed to Clinical Coordinator for input and/or approval      Comments from other involved providers and/or clinical coordinator: None    PATTI Garcia06/17/23

## 2023-07-03 ENCOUNTER — TELEPHONE (OUTPATIENT)
Dept: PSYCHIATRY | Facility: CLINIC | Age: 58
End: 2023-07-03

## 2023-07-03 NOTE — TELEPHONE ENCOUNTER
Contacted client about cancelling appt. with PATTI Corey due to him retiring. We are in the process of bringing new providers into our practice. Once we have their schedules, we will contact you to reschedule your appt. Refills will be processed.   Any questions please call [phone]

## 2023-08-25 ENCOUNTER — DOCUMENTATION (OUTPATIENT)
Dept: PSYCHIATRY | Facility: CLINIC | Age: 58
End: 2023-08-25

## 2023-08-25 ENCOUNTER — TELEPHONE (OUTPATIENT)
Dept: PSYCHIATRY | Facility: CLINIC | Age: 58
End: 2023-08-25

## 2023-08-25 NOTE — TELEPHONE ENCOUNTER
----- Message from Ayala Stevens sent at 8/25/2023 11:10 AM EDT -----  Regarding: medication  Clt needs refills until his apt.

## 2023-08-25 NOTE — TELEPHONE ENCOUNTER
rec'd letter from insurance, medication is formulary and does not need [facility] aware  Letter scanned

## 2023-08-25 NOTE — TELEPHONE ENCOUNTER
Left message asking pt to call back if he needs refills before next appt, per chart he should have enough    Asked that he call back letting us know which medication and pharmacy

## 2023-09-15 ENCOUNTER — TELEMEDICINE (OUTPATIENT)
Dept: PSYCHIATRY | Facility: CLINIC | Age: 58
End: 2023-09-15
Payer: COMMERCIAL

## 2023-09-15 DIAGNOSIS — G47.00 INSOMNIA, UNSPECIFIED TYPE: ICD-10-CM

## 2023-09-15 DIAGNOSIS — F31.32 BIPOLAR I DISORDER, MOST RECENT EPISODE DEPRESSED, MODERATE (HCC): Primary | ICD-10-CM

## 2023-09-15 DIAGNOSIS — F31.60 BIPOLAR I DISORDER, MOST RECENT EPISODE MIXED (HCC): ICD-10-CM

## 2023-09-15 PROCEDURE — 99214 OFFICE O/P EST MOD 30 MIN: CPT | Performed by: PSYCHIATRY & NEUROLOGY

## 2023-09-15 RX ORDER — TRAZODONE HYDROCHLORIDE 100 MG/1
100 TABLET ORAL
Qty: 90 TABLET | Refills: 1 | Status: SHIPPED | OUTPATIENT
Start: 2023-09-15

## 2023-09-15 RX ORDER — ARIPIPRAZOLE 10 MG/1
10 TABLET ORAL EVERY MORNING
Qty: 90 TABLET | Refills: 1 | Status: SHIPPED | OUTPATIENT
Start: 2023-09-15

## 2023-09-15 RX ORDER — MIRTAZAPINE 30 MG/1
30 TABLET, FILM COATED ORAL
Qty: 90 TABLET | Refills: 1 | Status: SHIPPED | OUTPATIENT
Start: 2023-09-15

## 2023-09-15 NOTE — PSYCH
This was a Telepsychiatry visit that took place through [address] [place] [address]. The patient's identity was verified via name and date of birth. Patient expressed understanding, and voluntarily agreed to proceed with the visit. This visit occurred at the address listed in the patient's chart as the patient's residence. Chief Complaint:    Bipolar depression and insomnia    Interval Summary    Pt said that he has been doing better since his clast visit. He said he spends most time at home as he feels anxious being around people. He gets anxious to go out for grocery shopping and getting stuff done. He said he feels better when he is at home. He said he is less depressed, on and off, few times a week, and able to cope with both depression and anxiety. 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 He said he no longer can perform and became isolated as he started "hating people" and likes being by himself. He said he has no contact with his family as he does not want them to know he has psych issues. He said his son lives 25 miles away and other two live out of state and does not talk to them. 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 He said he sells them at times and wants all these to go to his grand kids. He said he bikes to different places. He moved to a place with a kitchen and he is able to cook for himself. He has SSI.     Psychiatric ROS    Sleep: Good with trazodone  Appetite: Good  Suicidal ideation: Denied  Homicidal ideation: Denied  Psychosis: None reported  Codi: None reported  PTSD: None reported  Panic attacks: None reported  Memory issues: None reported  Side effects: None reported    D+A Use    Alcohol: Drinks a beer, 2-3 times a moth, has cirrhosis from Hep C  Nicotine: None  Other substances: Smokes MJ few hits in a week, with a friend    Medical Issues  Acute medical issues at present: None  Last PCP Visit: 3 months ago      Medical Review of Systems    C/o SOB at times due to COPD, has pelvic pain (prostate) and lower back pain at times    No fever/dizziness/blurred vision/cough/chest pain/shortness of breath/abdominal pain/nausea/vomiting/bladder or bowel problems/headache/weakness/rigidity or pain reported. Any symptoms reported by patient are listed in the interval summary. Mental Status Exam    Motor: Normal gait and no abnormal movements  General appearance and behavior: Moderately kempt, calm, cooperative, pleasant, good eye contact, good rapport, no psychomotor abnormalities noted  Speech: Spontaneous with normal rate, normal volume and normal tone  Mood: "Anxious"  Affect: Euthymic, congruent with mood, normal range, appropriate  Thought Process: linear  Thought content: Denied suicidal or homicidal ideation. No delusions elicited  Perception: Denied any auditory or visual hallucinations  Insight: Good  Judgment: Good  Orientation: Oriented to person, place and time  Attention/Concentration: Good  Memory - Grossly intact  Language - [address] of Knowledge - Adequate    Assessment and Plan    Bipolar I disorder, recent episode depression, moderate    - Continue Abilify 10 mg Po daily    - Continue mirtazapine 30 mg PO HS    Insomnia unspecified - Continue trazodone 100 mg PO HS PRN sleep    Cannabis use disorder - Continue monitor    The clinical diagnosis, course and prognosis were explained to the patient. Discussed with patient the clinical indications, interactions, benefits, the most common and serious side effects of all current medications. The alternative treatment options were discussed. The importance of continuing in psychotherapy was reinstated. The patient was receptive and appeared to understand the information provided. Patient's concerns and questions were addressed to patient's satisfaction during the appointment, and the patient agreed to the treatment plan.     Patient is aware of adverse effects of use of alcohol and/or other substances on mental illness and is aware of the risk of combining alcohol and/or substances with the medications that are currently prescribed. The patient was advised go to the nearest emergency room or call 911 if new symptoms arise or existing symptoms worsen or has thoughts of hurting self or others.     Follow up in 4 weeks

## 2023-09-19 ENCOUNTER — TELEPHONE (OUTPATIENT)
Dept: PSYCHIATRY | Facility: CLINIC | Age: 58
End: 2023-09-19

## 2023-10-03 ENCOUNTER — DOCUMENTATION (OUTPATIENT)
Dept: ENDOCRINOLOGY | Facility: HOSPITAL | Age: 58
End: 2023-10-03

## 2023-10-03 NOTE — PROGRESS NOTES
Received request for medical records for disability. No endocrinology records on file. Faxed request to [facility].

## 2023-10-17 ENCOUNTER — TELEMEDICINE (OUTPATIENT)
Dept: PSYCHIATRY | Facility: CLINIC | Age: 58
End: 2023-10-17

## 2023-10-17 DIAGNOSIS — F12.90 CANNABIS USE DISORDER: ICD-10-CM

## 2023-10-17 DIAGNOSIS — F31.60 BIPOLAR I DISORDER, MOST RECENT EPISODE MIXED (HCC): ICD-10-CM

## 2023-10-17 DIAGNOSIS — G47.00 INSOMNIA, UNSPECIFIED TYPE: ICD-10-CM

## 2023-10-17 DIAGNOSIS — F31.32 BIPOLAR I DISORDER, MOST RECENT EPISODE DEPRESSED, MODERATE (HCC): Primary | ICD-10-CM

## 2023-10-17 RX ORDER — TRAZODONE HYDROCHLORIDE 100 MG/1
100 TABLET ORAL
Qty: 90 TABLET | Refills: 1 | Status: SHIPPED | OUTPATIENT
Start: 2023-10-17

## 2023-10-17 RX ORDER — MIRTAZAPINE 30 MG/1
30 TABLET, FILM COATED ORAL
Qty: 90 TABLET | Refills: 1 | Status: SHIPPED | OUTPATIENT
Start: 2023-10-17

## 2023-10-17 RX ORDER — ARIPIPRAZOLE 10 MG/1
10 TABLET ORAL EVERY MORNING
Qty: 90 TABLET | Refills: 1 | Status: SHIPPED | OUTPATIENT
Start: 2023-10-17

## 2023-10-17 NOTE — BH TREATMENT PLAN
TREATMENT PLAN (Medication Management Only)        [facility]    Name and Date of Birth:  Coyt Su 59 y.o. 1965  Date of Treatment Plan: October 17, 2023  Diagnosis/Diagnoses:    1. Bipolar I disorder, most recent episode depressed, moderate ([address])    2. Insomnia, unspecified type    3. Cannabis use disorder    4. Bipolar I disorder, most recent episode mixed [facility])        Strengths/Personal Resources for Self-Care:    Area/Areas of need (in own words):    1. Long Term Goal: Management of symptoms  Target Date: 6 months  Person/Persons responsible for completion of goal: Patient    2. Short Term Objective(s): How will we reach this goal - Continue medications as prescribed  Target Date: 6 months  Person/Persons Responsible for Completion of Goal: Patient    Progress Towards Goals: Stable    Treatment Modality: Medication management every two months    Review due in 180 days from the date of this plan    Expected length of service: Follow up appointment every 1-2 months for maintenance    My Physician/PA/NP and I have developed this plan together and I agree to work on the goals and objectives. I understand the treatment goals that were developed for my treatment. I consent to the above treatment plan.

## 2023-10-17 NOTE — PSYCH
This was a Telepsychiatry visit that took place through [address] [place] [address]. The patient's identity was verified via name and date of birth. Patient expressed understanding, and voluntarily agreed to proceed with the visit. This visit occurred at the address listed in the patient's chart as the patient's residence. Chief Complaint:    Bipolar depression and insomnia    Interval Summary    Pt said that he has very stressed in the last week or two. He said he is planning to move into a basement apartment his friend has and it is a nicer place. He is stressed about the move, but he will have a place of his own and place to do his art. His roommate is not a cean person. He said he is much less depressed and he is anxious on and off, mostly situational. He denied any other stressors. Psychiatric ROS    Sleep: Good with trazodone  Appetite: Good  Suicidal ideation: Denied  Homicidal ideation: Denied  Psychosis: None reported  Codi: None reported  PTSD: None reported  Panic attacks: None reported  Memory issues: None reported  Side effects: None reported    D+A Use    Alcohol: Drinks a beer, 2-3 times a moth, has cirrhosis from Hep C  Nicotine: None  Other substances: Smokes MJ few hits in a week, with a friend    Medical Issues  Acute medical issues at present: None  Last PCP Visit: 4 months ago    Medical Review of Systems    C/o SOB at times due to COPD, has pelvic pain (prostate) and lower back pain at times    No fever/dizziness/blurred vision/cough/chest pain/shortness of breath/abdominal pain/nausea/vomiting/bladder or bowel problems/headache/weakness/rigidity or pain reported. Any symptoms reported by patient are listed in the interval summary.     Mental Status Exam    Motor: Normal gait and no abnormal movements  General appearance and behavior: Moderately kempt, calm, cooperative, pleasant, good eye contact, good rapport, no psychomotor abnormalities noted  Speech: Spontaneous with normal rate, normal volume and normal tone  Mood: "Anxious"  Affect: Euthymic, congruent with mood, normal range, appropriate  Thought Process: linear  Thought content: Denied suicidal or homicidal ideation. No delusions elicited  Perception: Denied any auditory or visual hallucinations  Insight: Good  Judgment: Good  Orientation: Oriented to person, place and time  Attention/Concentration: Good  Memory - Grossly intact  Language - [address] of Knowledge - Adequate    Assessment and Plan    Bipolar I disorder, recent episode depression, moderate    - Continue Abilify 10 mg Po daily    - Continue mirtazapine 30 mg PO HS    Insomnia unspecified - Continue trazodone 100 mg PO HS PRN sleep    Cannabis use disorder - Continue monitor    The clinical diagnosis, course and prognosis were explained to the patient. Discussed with patient the clinical indications, interactions, benefits, the most common and serious side effects of all current medications. The alternative treatment options were discussed. The importance of continuing in psychotherapy was reinstated. The patient was receptive and appeared to understand the information provided. Patient's concerns and questions were addressed to patient's satisfaction during the appointment, and the patient agreed to the treatment plan. Patient is aware of adverse effects of use of alcohol and/or other substances on mental illness and is aware of the risk of combining alcohol and/or substances with the medications that are currently prescribed. The patient was advised go to the nearest emergency room or call 911 if new symptoms arise or existing symptoms worsen or has thoughts of hurting self or others.     Follow up in 8 weeks    Start time: 405 pm  Stop time: 425 pm

## 2023-12-12 ENCOUNTER — TELEMEDICINE (OUTPATIENT)
Dept: PSYCHIATRY | Facility: CLINIC | Age: 58
End: 2023-12-12
Payer: COMMERCIAL

## 2023-12-12 DIAGNOSIS — G47.00 INSOMNIA, UNSPECIFIED TYPE: ICD-10-CM

## 2023-12-12 DIAGNOSIS — F31.32 BIPOLAR I DISORDER, MOST RECENT EPISODE DEPRESSED, MODERATE (HCC): Primary | ICD-10-CM

## 2023-12-12 DIAGNOSIS — F12.90 CANNABIS USE DISORDER: ICD-10-CM

## 2023-12-12 DIAGNOSIS — F31.60 BIPOLAR I DISORDER, MOST RECENT EPISODE MIXED (HCC): ICD-10-CM

## 2023-12-12 PROCEDURE — 99213 OFFICE O/P EST LOW 20 MIN: CPT | Performed by: PSYCHIATRY & NEUROLOGY

## 2023-12-12 RX ORDER — ARIPIPRAZOLE 10 MG/1
10 TABLET ORAL EVERY MORNING
Qty: 90 TABLET | Refills: 1 | Status: SHIPPED | OUTPATIENT
Start: 2023-12-12

## 2023-12-12 RX ORDER — MIRTAZAPINE 30 MG/1
30 TABLET, FILM COATED ORAL
Qty: 90 TABLET | Refills: 1 | Status: SHIPPED | OUTPATIENT
Start: 2023-12-12

## 2023-12-12 RX ORDER — TRAZODONE HYDROCHLORIDE 100 MG/1
100 TABLET ORAL
Qty: 90 TABLET | Refills: 1 | Status: SHIPPED | OUTPATIENT
Start: 2023-12-12

## 2023-12-12 NOTE — PSYCH
After connecting through [employer], patient was verified with two unique identifiers. Patient (or authorized legal representative) was then informed that this was a Telemedicine visit and that the exam was being conducted confidentially over secure lines. My office door was closed. No one else was in the room with me. Patient acknowledged consent and understanding of privacy and security of the Telemedicine visit, and gave permission to have a telemedicine presenter stay in the room in order to assist with the history and to conduct the exam as needed. I informed the patient that I have reviewed their record in Epic and presented the opportunity for them to ask any questions regarding the visit today. The patient agreed to participate. Chief Complaint:    Bipolar depression and insomnia    Interval Summary    Pt said that he is stressed about the move into the basement of his friends' place. 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 He said he feels anxious about the move and he feels depressed on and off, with his current situation. He said he is busy with his art and keeps mostly himself.     Psychiatric ROS    Sleep: Disturbed few nights, 4-5 hours, taking trazodone as needed  Appetite: Good  Suicidal ideation: Denied  Homicidal ideation: Denied  Psychosis: None reported  Codi: None reported  PTSD: None reported  Panic attacks: None reported  Memory issues: None reported  Side effects: None reported    D+A Use    Alcohol: Drinks a beer, 2-3 times a month, has cirrhosis from Hep C, none in last 2 months  Nicotine: None  Other substances: Smokes MJ few hits in a week, with a friend    Medical Issues  Acute medical issues at present: None  Last PCP Visit: 5 months ago    Medical Review of Systems    C/o SOB at times due to COPD, has pelvic pain (prostate) and lower back pain at times    No fever/dizziness/blurred vision/cough/chest pain/shortness of breath/abdominal pain/nausea/vomiting/bladder or bowel problems/headache/weakness/rigidity or pain reported. Any symptoms reported by patient are listed in the interval summary. Mental Status Exam    Motor: Normal gait and no abnormal movements  General appearance and behavior: Moderately kempt, calm, cooperative, pleasant, good eye contact, good rapport, no psychomotor abnormalities noted  Speech: Spontaneous with normal rate, normal volume and normal tone  Mood: "Anxious"  Affect: Euthymic, congruent with mood, normal range, appropriate  Thought Process: linear  Thought content: Denied suicidal or homicidal ideation. No delusions elicited  Perception: Denied any auditory or visual hallucinations  Insight: Good  Judgment: Good  Orientation: Oriented to person, place and time  Attention/Concentration: Good  Memory - Grossly intact  Language - [address] of Knowledge - Adequate    Assessment and Plan    Bipolar I disorder, recent episode depression, moderate    - Continue Abilify 10 mg Po daily    - Continue mirtazapine 30 mg PO HS    Insomnia unspecified - Continue trazodone 100 mg PO HS PRN sleep    Cannabis use disorder - Continue monitor    The clinical diagnosis, course and prognosis were explained to the patient. Discussed with patient the clinical indications, interactions, benefits, the most common and serious side effects of all current medications. The alternative treatment options were discussed. The importance of continuing in psychotherapy was reinstated. The patient was receptive and appeared to understand the information provided. Patient's concerns and questions were addressed to patient's satisfaction during the appointment, and the patient agreed to the treatment plan.     Patient is aware of adverse effects of use of alcohol and/or other substances on mental illness and is aware of the risk of combining alcohol and/or substances with the medications that are currently prescribed. The patient was advised go to the nearest emergency room or call 911 if new symptoms arise or existing symptoms worsen or has thoughts of hurting self or others. Behavioral Health OP Treatment Plan      Name and Date of Birth:  Aldo Augustine 59 y.o. 1965    Date of Treatment Plan: December 12, 2023    Diagnosis/Diagnoses:     1. Bipolar I disorder, most recent episode depressed, moderate ([address])    2. Insomnia, unspecified type    3. Cannabis use disorder          Strengths/Personal Resources for Self-Care: Good insight, compliant with treatment, good family support    Area/Areas of need (in own words):    1. Long Term Goal: Management of symptoms  Target Date: 6 months  Person/Persons responsible for completion of goal: Patient    2. Short Term Objective(s): How will we reach this goal - Continue medications as prescribed  Target Date: 6 months  Person/Persons Responsible for Completion of Goal: Patient    Progress Towards Goals: Stable    Treatment Modality: Medication management every two months    Review due in 180 days from the date of this plan    Expected length of service: Follow up appointment every 1-2 months for maintenance    My Physician/PA/NP and I have developed this plan together and I agree to work on the goals and objectives. I understand the treatment goals that were developed for my treatment. I consent to the above treatment plan.     Follow up in 8 weeks    Start time: 405 pm  Stop time: 425 pm

## 2024-02-13 ENCOUNTER — TELEMEDICINE (OUTPATIENT)
Dept: PSYCHIATRY | Facility: CLINIC | Age: 59
End: 2024-02-13
Payer: COMMERCIAL

## 2024-02-13 DIAGNOSIS — F12.90 CANNABIS USE DISORDER: ICD-10-CM

## 2024-02-13 DIAGNOSIS — F31.60 BIPOLAR I DISORDER, MOST RECENT EPISODE MIXED (HCC): ICD-10-CM

## 2024-02-13 DIAGNOSIS — G47.00 INSOMNIA, UNSPECIFIED TYPE: ICD-10-CM

## 2024-02-13 DIAGNOSIS — F31.32 BIPOLAR I DISORDER, MOST RECENT EPISODE DEPRESSED, MODERATE (HCC): Primary | ICD-10-CM

## 2024-02-13 PROCEDURE — 99213 OFFICE O/P EST LOW 20 MIN: CPT | Performed by: PSYCHIATRY & NEUROLOGY

## 2024-02-13 RX ORDER — ARIPIPRAZOLE 10 MG/1
10 TABLET ORAL EVERY MORNING
Qty: 90 TABLET | Refills: 1 | Status: SHIPPED | OUTPATIENT
Start: 2024-02-13

## 2024-02-13 RX ORDER — TRAZODONE HYDROCHLORIDE 100 MG/1
100 TABLET ORAL
Qty: 90 TABLET | Refills: 1 | Status: SHIPPED | OUTPATIENT
Start: 2024-02-13

## 2024-02-13 RX ORDER — MIRTAZAPINE 30 MG/1
30 TABLET, FILM COATED ORAL
Qty: 90 TABLET | Refills: 1 | Status: SHIPPED | OUTPATIENT
Start: 2024-02-13

## 2024-02-13 NOTE — PSYCH
After connecting through [employer], patient was verified with two unique identifiers.  Patient (or authorized legal representative) was then informed that this was a Telemedicine visit and that the exam was being conducted confidentially over secure lines. My office door was closed.  No one else was in the room with me.  Patient acknowledged consent and understanding of privacy and security of the Telemedicine visit, and gave permission to have a telemedicine presenter stay in the room in order to assist with the history and to conduct the exam as needed.  I informed the patient that I have reviewed their record in Epic and presented the opportunity for them to ask any questions regarding the visit today.  The patient agreed to participate.    Chief Complaint:    Bipolar depression and insomnia    Interval Summary    Pt said that he moved into the basement of her friend's house. He said that it did not work out. He said that her friend's god  and she decided to go to [place] and asked the patient to accompnay her. After being there for one week, she was upset that the patient told her  that she was in [place] when she had him not to let anyone know. He said that she asked him to move out and he moved back to his old apartment 3 days ago. He said that he was upset and anxious and he is trying to get his things back from her basement. He said he has been coping well. She also told him that he cannot smoke in her basement    Psychiatric ROS    Sleep: Good, 10-12 hours, sleeps during the day  Appetite: Good  Suicidal ideation: Denied  Homicidal ideation: Denied  Psychosis: None reported  Codi: None reported  PTSD: None reported  Panic attacks: None reported  Memory issues: None reported  Side effects: None reported    D+A Use    Alcohol: Drinks a beer, 2-3 times a month  Nicotine: None  Other substances: Smokes MJ few hits in a week, to help with his anxiety    Medical Issues  Acute medical issues at  "present: None  Last PCP Visit: 7 months ago    Medical Review of Systems    C/o SOB at times due to COPD, has pelvic pain (prostate) and lower back pain at times    No fever/dizziness/blurred vision/cough/chest pain/shortness of breath/abdominal pain/nausea/vomiting/bladder or bowel problems/headache/weakness/rigidity or pain reported. Any symptoms reported by patient are listed in the interval summary.    Mental Status Exam    Motor: Normal gait and no abnormal movements  General appearance and behavior: Moderately kempt, calm, cooperative, pleasant, good eye contact, good rapport, no psychomotor abnormalities noted  Speech: Spontaneous with normal rate, normal volume and normal tone  Mood: \"Stressed\"  Affect: Euthymic, congruent with mood, normal range, appropriate  Thought Process: linear  Thought content: Denied suicidal or homicidal ideation. No delusions elicited  Perception: Denied any auditory or visual hallucinations  Insight: Good  Judgment: Good  Orientation: Oriented to person, place and time  Attention/Concentration: Good  Memory - Grossly intact  Language - Fluent  Fund of Knowledge - Adequate    Assessment and Plan    Bipolar I disorder, recent episode depression, moderate    - Continue Abilify 10 mg Po daily    - Continue mirtazapine 30 mg PO HS    Insomnia unspecified - Continue trazodone 100 mg PO HS PRN sleep    Cannabis use disorder - Continue monitor    The clinical diagnosis, course and prognosis were explained to the patient. Discussed with patient the clinical indications, interactions, benefits, the most common and serious side effects of all current medications. The alternative treatment options were discussed. The importance of continuing in psychotherapy was reinstated. The patient was receptive and appeared to understand the information provided. Patient's concerns and questions were addressed to patient's satisfaction during the appointment, and the patient agreed to the treatment " plan.    Patient is aware of adverse effects of use of alcohol and/or other substances on mental illness and is aware of the risk of combining alcohol and/or substances with the medications that are currently prescribed. The patient was advised go to the nearest emergency room or call 911 if new symptoms arise or existing symptoms worsen or has thoughts of hurting self or others.      Behavioral Health OP Treatment Plan      Name and Date of Birth:  Devin Jiménez 58 y.o. 1965    Date of Treatment Plan: February 13, 2024    Diagnosis/Diagnoses:     1. Bipolar I disorder, most recent episode depressed, moderate (HCC)    2. Insomnia, unspecified type    3. Cannabis use disorder          Strengths/Personal Resources for Self-Care: Good insight, compliant with treatment, good family support    Area/Areas of need (in own words):    1. Long Term Goal: Management of symptoms  Target Date: 6 months  Person/Persons responsible for completion of goal: Patient    2.  Short Term Objective(s):  How will we reach this goal - Continue medications as prescribed  Target Date: 6 months  Person/Persons Responsible for Completion of Goal: Patient    Progress Towards Goals: Stable    Treatment Modality: Medication management every two months    Review due in 180 days from the date of this plan    Expected length of service: Follow up appointment every 1-2 months for maintenance    My Physician/PA/NP and I have developed this plan together and I agree to work on the goals and objectives. I understand the treatment goals that were developed for my treatment. I consent to the above treatment plan.    Follow up in 8 weeks    Start time: 420 pm  Stop time: 445 pm           66.6

## 2024-02-15 ENCOUNTER — TELEPHONE (OUTPATIENT)
Dept: PSYCHIATRY | Facility: CLINIC | Age: 59
End: 2024-02-15

## 2024-02-15 NOTE — TELEPHONE ENCOUNTER
Left voicemail informing patient of the Psych Encounter form needing to be signed as a requirement from the insurance company for billing purposes. Patient can access form via [employer] and sign electronically.     Please make patient aware this form must be signed for each visit as a requirement to continue future visits with provider.

## 2024-02-22 ENCOUNTER — TELEPHONE (OUTPATIENT)
Dept: PSYCHIATRY | Facility: CLINIC | Age: 59
End: 2024-02-22

## 2024-03-01 ENCOUNTER — TELEPHONE (OUTPATIENT)
Dept: PSYCHIATRY | Facility: CLINIC | Age: 59
End: 2024-03-01

## 2024-03-01 NOTE — TELEPHONE ENCOUNTER
Left voicemail informing patient of the Psych Encounter form needing to be signed as a requirement from the insurance company for billing purposes. Patient can access form via [employer] and sign electronically.     Please make patient aware this form must be signed for each visit as a requirement to continue future visits with provider.

## 2024-04-11 ENCOUNTER — TELEMEDICINE (OUTPATIENT)
Dept: PSYCHIATRY | Facility: CLINIC | Age: 59
End: 2024-04-11

## 2024-04-11 DIAGNOSIS — Z91.199 NO-SHOW FOR APPOINTMENT: Primary | ICD-10-CM

## 2024-04-11 NOTE — PSYCH
Pt connected to the video, but the connection was pretty bad and I could not hear them and they could not hear me. I tried calling him on his cell phone and the connection was pretty bad. I could not hear anything. Requested the patient to reschedule and he might have to come in to the office to use the video call at  for his next appt.

## 2024-05-17 ENCOUNTER — TELEMEDICINE (OUTPATIENT)
Dept: PSYCHIATRY | Facility: CLINIC | Age: 59
End: 2024-05-17
Payer: COMMERCIAL

## 2024-05-17 DIAGNOSIS — F31.60 BIPOLAR I DISORDER, MOST RECENT EPISODE MIXED (HCC): ICD-10-CM

## 2024-05-17 DIAGNOSIS — F12.90 CANNABIS USE DISORDER: ICD-10-CM

## 2024-05-17 DIAGNOSIS — F31.32 BIPOLAR I DISORDER, MOST RECENT EPISODE DEPRESSED, MODERATE (HCC): Primary | ICD-10-CM

## 2024-05-17 DIAGNOSIS — G47.00 INSOMNIA, UNSPECIFIED TYPE: ICD-10-CM

## 2024-05-17 PROCEDURE — 99213 OFFICE O/P EST LOW 20 MIN: CPT | Performed by: PSYCHIATRY & NEUROLOGY

## 2024-05-17 RX ORDER — TRAZODONE HYDROCHLORIDE 100 MG/1
100 TABLET ORAL
Qty: 90 TABLET | Refills: 1 | Status: SHIPPED | OUTPATIENT
Start: 2024-05-17

## 2024-05-17 RX ORDER — MIRTAZAPINE 30 MG/1
30 TABLET, FILM COATED ORAL
Qty: 90 TABLET | Refills: 1 | Status: SHIPPED | OUTPATIENT
Start: 2024-05-17

## 2024-05-17 RX ORDER — ARIPIPRAZOLE 10 MG/1
10 TABLET ORAL EVERY MORNING
Qty: 90 TABLET | Refills: 1 | Status: SHIPPED | OUTPATIENT
Start: 2024-05-17

## 2024-05-17 NOTE — PSYCH
After connecting through [employer] [facility], patient was verified with two unique identifiers. Patient confirmed that they were at Home/Office and were in [place] at the time of the appointment.    Patient (or authorized legal representative) was then informed that this was a Telemedicine visit and that the exam was being conducted confidentially over secure lines. My office door was closed.  No one else was in the room with me.  Patient acknowledged consent and understanding of privacy and security of the Telemedicine visit, and gave permission to have a telemedicine presenter stay in the room in order to assist with the history and to conduct the exam as needed.  I informed the patient that I have reviewed their record in Epic and presented the opportunity for them to ask any questions regarding the visit today.  The patient agreed to participate.  Chief Complaint:    Bipolar depression and insomnia    Interval Summary    Pt said that he is doing better since his last visit. He said that he is much less depressed, but continues to feel anxious on and off. He said that he is building a electric bike and this process is stressing him as he keeps losing some parts. 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 He denied any other stressors. He does not like to leave the house. He is planning to start singing and playing guitar again soon.    Psychiatric ROS    Sleep: Good, 10-12 hours, feels rested  Appetite: Good, not eating much, not going to the grocery store much due to sore feet  Suicidal ideation: Denied  Homicidal ideation: Denied  Psychosis: None reported  Codi: None reported  PTSD: None reported  Panic attacks: None reported  Memory issues: None reported  Side effects: None reported    D+A Use    Alcohol: Drinks a beer, once a month  Nicotine: None  Other substances: Smokes MJ few hits in a week, to help with his anxiety    Medical Issues  Acute medical issues at present:  "None  Last PCP Visit: 9 months ago, plans to go to his PCP for his feet    Medical Review of Systems    C/o SOB at times due to COPD, has pelvic pain (prostate) and lower back pain at times. C/o sore feet     No fever/dizziness/blurred vision/cough/chest pain/shortness of breath/abdominal pain/nausea/vomiting/bladder or bowel problems/headache/weakness/rigidity or pain reported. Any symptoms reported by patient are listed in the interval summary.    Mental Status Exam    Motor: Normal gait and no abnormal movements  General appearance and behavior: Moderately kempt, calm, cooperative, pleasant, good eye contact, good rapport, no psychomotor abnormalities noted  Speech: Spontaneous with normal rate, normal volume and normal tone  Mood: \"Anxious\"  Affect: Euthymic, congruent with mood, normal range, appropriate  Thought Process: linear  Thought content: Denied suicidal or homicidal ideation. No delusions elicited  Perception: Denied any auditory or visual hallucinations  Insight: Good  Judgment: Good  Orientation: Oriented to person, place and time  Attention/Concentration: Good  Memory - Grossly intact  Language - Fluent  Fund of Knowledge - Adequate    Assessment and Plan    Bipolar I disorder, recent episode depression, moderate    - Continue Abilify 10 mg Po daily    - Continue mirtazapine 30 mg PO HS    Insomnia unspecified - Continue trazodone 100 mg PO HS PRN sleep    Cannabis use disorder - Continue monitor    The clinical diagnosis, course and prognosis were explained to the patient. Discussed with patient the clinical indications, interactions, benefits, the most common and serious side effects of all current medications. The alternative treatment options were discussed. The importance of continuing in psychotherapy was reinstated. The patient was receptive and appeared to understand the information provided. Patient's concerns and questions were addressed to patient's satisfaction during the appointment, " and the patient agreed to the treatment plan.    Patient is aware of adverse effects of use of alcohol and/or other substances on mental illness and is aware of the risk of combining alcohol and/or substances with the medications that are currently prescribed. The patient was advised go to the nearest emergency room or call 911 if new symptoms arise or existing symptoms worsen or has thoughts of hurting self or others.      Behavioral Health OP Treatment Plan      Name and Date of Birth:  Devin Jiménez 59 y.o. 1965    Date of Treatment Plan: May 17, 2024    Diagnosis/Diagnoses:     1. Bipolar I disorder, most recent episode depressed, moderate (HCC)    2. Insomnia, unspecified type    3. Cannabis use disorder          Strengths/Personal Resources for Self-Care: Good insight, compliant with treatment, good family support    Area/Areas of need (in own words):    1. Long Term Goal: Management of symptoms  Target Date: 6 months  Person/Persons responsible for completion of goal: Patient    2.  Short Term Objective(s):  How will we reach this goal - Continue medications as prescribed  Target Date: 6 months  Person/Persons Responsible for Completion of Goal: Patient    Progress Towards Goals: Stable    Treatment Modality: Medication management every two months    Review due in 180 days from the date of this plan    Expected length of service: Follow up appointment every 1-2 months for maintenance    My Physician/PA/NP and I have developed this plan together and I agree to work on the goals and objectives. I understand the treatment goals that were developed for my treatment. I consent to the above treatment plan.    Follow up in 12 weeks    Start time: 430 pm    Stop time: 446 pm

## 2024-05-21 ENCOUNTER — TELEPHONE (OUTPATIENT)
Dept: PSYCHIATRY | Facility: CLINIC | Age: 59
End: 2024-05-21

## 2024-05-30 ENCOUNTER — TELEPHONE (OUTPATIENT)
Dept: PSYCHIATRY | Facility: CLINIC | Age: 59
End: 2024-05-30

## 2024-05-30 NOTE — TELEPHONE ENCOUNTER
Left voicemail informing patient and/or parent/guardian of the Psych Encounter form needing to be signed as a requirement from the insurance company for billing purposes. Patient can access form via [employer] and sign electronically.     Please make patient aware this form must be signed for each visit as a requirement to continue future visits with provider.

## 2024-06-05 ENCOUNTER — TELEPHONE (OUTPATIENT)
Dept: PSYCHIATRY | Facility: CLINIC | Age: 59
End: 2024-06-05

## 2024-09-13 DIAGNOSIS — F31.60 BIPOLAR I DISORDER, MOST RECENT EPISODE MIXED (HCC): ICD-10-CM

## 2024-09-13 RX ORDER — TRAZODONE HYDROCHLORIDE 100 MG/1
100 TABLET ORAL
Qty: 90 TABLET | Refills: 1 | OUTPATIENT
Start: 2024-09-13

## 2024-09-13 RX ORDER — MIRTAZAPINE 30 MG/1
30 TABLET, FILM COATED ORAL
Qty: 90 TABLET | Refills: 1 | OUTPATIENT
Start: 2024-09-13

## 2024-09-13 RX ORDER — ARIPIPRAZOLE 10 MG/1
10 TABLET ORAL EVERY MORNING
Qty: 90 TABLET | Refills: 1 | OUTPATIENT
Start: 2024-09-13

## 2024-09-17 ENCOUNTER — TELEMEDICINE (OUTPATIENT)
Dept: PSYCHIATRY | Facility: CLINIC | Age: 59
End: 2024-09-17
Payer: COMMERCIAL

## 2024-09-17 DIAGNOSIS — F31.60 BIPOLAR I DISORDER, MOST RECENT EPISODE MIXED (HCC): Primary | ICD-10-CM

## 2024-09-17 PROCEDURE — 99213 OFFICE O/P EST LOW 20 MIN: CPT | Performed by: PSYCHIATRY & NEUROLOGY

## 2024-09-17 RX ORDER — ARIPIPRAZOLE 10 MG/1
10 TABLET ORAL EVERY MORNING
Qty: 30 TABLET | Refills: 1 | Status: SHIPPED | OUTPATIENT
Start: 2024-09-17

## 2024-09-17 RX ORDER — MIRTAZAPINE 30 MG/1
30 TABLET, FILM COATED ORAL
Qty: 30 TABLET | Refills: 1 | Status: SHIPPED | OUTPATIENT
Start: 2024-09-17

## 2024-09-17 RX ORDER — TRAZODONE HYDROCHLORIDE 100 MG/1
100 TABLET ORAL
Qty: 30 TABLET | Refills: 1 | Status: SHIPPED | OUTPATIENT
Start: 2024-09-17

## 2024-09-17 NOTE — PSYCH
After connecting through [employer], patient was verified with two unique identifiers. Patient confirmed that they were at Home/Office and were in [place] at the time of the appointment.    Patient (or authorized legal representative) was then informed that this was a Telemedicine visit and that the exam was being conducted confidentially over secure lines. My office door was closed.  No one else was in the room with me.  Patient acknowledged consent and understanding of privacy and security of the Telemedicine visit, and gave permission to have a telemedicine presenter stay in the room in order to assist with the history and to conduct the exam as needed.  I informed the patient that I have reviewed their record in Epic and presented the opportunity for them to ask any questions regarding the visit today.  The patient agreed to participate.  Chief Complaint:    Bipolar depression and insomnia    Interval Summary    Pt said that he is doing better since his last visit. He denied feeling depressed for last few weeks. He feels anxious, on and off, and is able to cope better with it. He said he lost his food stamps and that was stressful, but he got them back. He is tressed about his finances and getting by every day. He keeps himself busy with his dog and it helps.    Psychiatric ROS    Sleep: Good, 12 hours, feels rested  Appetite: Good  Suicidal ideation: Denied  Homicidal ideation: Denied  Psychosis: None reported  Codi: None reported  PTSD: None reported  Panic attacks: None reported  Memory issues: None reported  Med compliance: Good  Side effects: None reported    D+A Use    Alcohol: Drinks a beer, once a month  Nicotine: None  Other substances: Smokes MJ few hits in a week, none in two months    Medical Issues  Acute medical issues at present: None  Last PCP Visit: 12 months ago    Medical Review of Systems    C/o SOB at times due to COPD, lower back pain at times. C/o sore feet    No  "fever/dizziness/blurred vision/cough/chest pain/shortness of breath/abdominal pain/nausea/vomiting/bladder or bowel problems/headache/weakness/rigidity or pain reported. Any symptoms reported by patient are listed in the interval summary.    Mental Status Exam    Motor: Normal gait and no abnormal movements  General appearance and behavior: Moderately kempt, calm, cooperative, pleasant, good eye contact, good rapport, no psychomotor abnormalities noted  Speech: Spontaneous with normal rate, normal volume and normal tone  Mood: \"Anxious\"  Affect: Euthymic, congruent with mood, normal range, appropriate  Thought Process: linear  Thought content: Denied suicidal or homicidal ideation. No delusions elicited  Perception: Denied any auditory or visual hallucinations  Insight: Good  Judgment: Good  Orientation: Oriented to person, place and time  Attention/Concentration: Good  Memory - Grossly intact  Language - Fluent  Fund of Knowledge - Adequate    Assessment and Plan    Bipolar I disorder, recent episode depression, moderate    - Continue Abilify 10 mg Po daily    - Continue mirtazapine 30 mg PO HS    Insomnia unspecified - Continue trazodone 100 mg PO HS PRN sleep    Cannabis use disorder - Continue monitor    The clinical diagnosis, course and prognosis were explained to the patient. Discussed with patient the clinical indications, interactions, benefits, the most common and serious side effects of all current medications. The alternative treatment options were discussed. The importance of continuing in psychotherapy was reinstated. The patient was receptive and appeared to understand the information provided. Patient's concerns and questions were addressed to patient's satisfaction during the appointment, and the patient agreed to the treatment plan.    Patient is aware of adverse effects of use of alcohol and/or other substances on mental illness and is aware of the risk of combining alcohol and/or substances with " the medications that are currently prescribed. The patient was advised go to the nearest emergency room or call 911 if new symptoms arise or existing symptoms worsen or has thoughts of hurting self or others.      Behavioral Health OP Treatment Plan      Name and Date of Birth:  Devin Jiménez 59 y.o. 1965    Date of Treatment Plan: September 17, 2024    Diagnosis/Diagnoses:     1. Bipolar I disorder, most recent episode mixed (HCC)          Strengths/Personal Resources for Self-Care: Good insight, compliant with treatment, good family support    Area/Areas of need (in own words):    1. Long Term Goal: Management of symptoms  Target Date: 6 months  Person/Persons responsible for completion of goal: Patient    2.  Short Term Objective(s):  How will we reach this goal - Continue medications as prescribed  Target Date: 6 months  Person/Persons Responsible for Completion of Goal: Patient    Progress Towards Goals: Stable    Treatment Modality: Medication management every two months    Review due in 180 days from the date of this plan    Expected length of service: Follow up appointment every 1-2 months for maintenance    My Physician/PA/NP and I have developed this plan together and I agree to work on the goals and objectives. I understand the treatment goals that were developed for my treatment. I consent to the above treatment plan.    Follow up in 8 weeks    Start time: 420 pm    Stop time: 440 pm

## 2024-09-18 ENCOUNTER — TELEPHONE (OUTPATIENT)
Dept: PSYCHIATRY | Facility: CLINIC | Age: 59
End: 2024-09-18

## 2024-09-18 NOTE — TELEPHONE ENCOUNTER
Called pt and left VM instructing pt to sign onto the MyChart and sign all of the required documents prior to scheduling a f/u visit with Dr. Salazar.    Per provider, Case Management phone number was left for pt to see if he can get help through them.    Gave the [facility] phone number to call back after all the required documents are signed.    Please do not schedule f/u appt with Dr. Salazar until all of the required documents are signed.

## 2024-09-20 ENCOUNTER — TELEPHONE (OUTPATIENT)
Dept: PSYCHIATRY | Facility: CLINIC | Age: 59
End: 2024-09-20

## 2024-09-20 NOTE — TELEPHONE ENCOUNTER
Left voicemail informing patient and/or parent/guardian of the Psych Encounter form needing to be signed as a requirement from the insurance company for billing purposes. Patient can access form via [email or web address] and sign electronically.     Please make patient aware this form must be signed for each visit as a requirement to continue future visits with provider.    Virtual Encounter form 9/17/24

## 2024-10-01 ENCOUNTER — TELEPHONE (OUTPATIENT)
Dept: PSYCHIATRY | Facility: CLINIC | Age: 59
End: 2024-10-01

## 2024-10-01 NOTE — TELEPHONE ENCOUNTER
Left voicemail informing patient and/or parent/guardian of the Psych Encounter form needing to be signed as a requirement from the insurance company for billing purposes. Patient can access form via [employer] and sign electronically.     Please make patient aware this form must be signed for each visit as a requirement to continue future visits with provider.

## 2024-10-04 ENCOUNTER — TELEPHONE (OUTPATIENT)
Dept: PSYCHIATRY | Facility: CLINIC | Age: 59
End: 2024-10-04

## 2024-10-08 ENCOUNTER — TELEPHONE (OUTPATIENT)
Dept: PSYCHIATRY | Facility: CLINIC | Age: 59
End: 2024-10-08

## 2024-11-26 ENCOUNTER — APPOINTMENT (EMERGENCY)
Dept: RADIOLOGY | Facility: HOSPITAL | Age: 59
End: 2024-11-26
Payer: COMMERCIAL

## 2024-11-26 ENCOUNTER — HOSPITAL ENCOUNTER (EMERGENCY)
Facility: HOSPITAL | Age: 59
Discharge: HOME | End: 2024-11-26
Attending: EMERGENCY MEDICINE
Payer: COMMERCIAL

## 2024-11-26 ENCOUNTER — APPOINTMENT (EMERGENCY)
Dept: CT IMAGING | Facility: HOSPITAL | Age: 59
End: 2024-11-26
Payer: COMMERCIAL

## 2024-11-26 VITALS
TEMPERATURE: 98.2 F | HEART RATE: 91 BPM | DIASTOLIC BLOOD PRESSURE: 58 MMHG | SYSTOLIC BLOOD PRESSURE: 116 MMHG | RESPIRATION RATE: 18 BRPM | OXYGEN SATURATION: 96 %
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
## 2024-11-26 DIAGNOSIS — T07.XXXA ABRASIONS OF MULTIPLE SITES: ICD-10-CM

## 2024-11-26 DIAGNOSIS — S42.002A CLOSED DISPLACED FRACTURE OF LEFT CLAVICLE: ICD-10-CM

## 2024-11-26 LAB
ANION GAP SERPL CALCULATED.3IONS-SCNC: 6 MMOL/L (ref 4–13)
BASOPHILS # BLD AUTO: 0.03 THOUSANDS/ΜL (ref 0–0.1)
BASOPHILS NFR BLD AUTO: 0 % (ref 0–1)
BUN SERPL-MCNC: 16 MG/DL (ref 5–25)
CALCIUM SERPL-MCNC: 9.5 MG/DL (ref 8.4–10.2)
CHLORIDE SERPL-SCNC: 104 MMOL/L (ref 96–108)
CO2 SERPL-SCNC: 25 MMOL/L (ref 21–32)
CREAT SERPL-MCNC: 0.91 MG/DL (ref 0.6–1.3)
EOSINOPHIL # BLD AUTO: 0.04 THOUSAND/ΜL (ref 0–0.61)
EOSINOPHIL NFR BLD AUTO: 0 % (ref 0–6)
ERYTHROCYTE [DISTWIDTH] IN BLOOD BY AUTOMATED COUNT: 11.9 % (ref 11.6–15.1)
GFR SERPL CREATININE-BSD FRML MDRD: 90 ML/MIN/1.73SQ M
GLUCOSE SERPL-MCNC: 119 MG/DL (ref 65–140)
HCT VFR BLD AUTO: 41.1 % (ref 36.5–49.3)
HGB BLD-MCNC: 13.7 G/DL (ref 12–17)
IMM GRANULOCYTES # BLD AUTO: 0.07 THOUSAND/UL (ref 0–0.2)
IMM GRANULOCYTES NFR BLD AUTO: 1 % (ref 0–2)
LYMPHOCYTES # BLD AUTO: 0.66 THOUSANDS/ΜL (ref 0.6–4.47)
LYMPHOCYTES NFR BLD AUTO: 6 % (ref 14–44)
MCH RBC QN AUTO: 30.1 PG (ref 26.8–34.3)
MCHC RBC AUTO-ENTMCNC: 33.3 G/DL (ref 31.4–37.4)
MCV RBC AUTO: 90 FL (ref 82–98)
MONOCYTES # BLD AUTO: 0.71 THOUSAND/ΜL (ref 0.17–1.22)
MONOCYTES NFR BLD AUTO: 6 % (ref 4–12)
NEUTROPHILS # BLD AUTO: 9.78 THOUSANDS/ΜL (ref 1.85–7.62)
NEUTS SEG NFR BLD AUTO: 87 % (ref 43–75)
NRBC BLD AUTO-RTO: 0 /100 WBCS
PLATELET # BLD AUTO: 204 THOUSANDS/UL (ref 149–390)
PMV BLD AUTO: 9.3 FL (ref 8.9–12.7)
POTASSIUM SERPL-SCNC: 4.2 MMOL/L (ref 3.5–5.3)
RBC # BLD AUTO: 4.55 MILLION/UL (ref 3.88–5.62)
SODIUM SERPL-SCNC: 135 MMOL/L (ref 135–147)
WBC # BLD AUTO: 11.29 THOUSAND/UL (ref 4.31–10.16)

## 2024-11-26 PROCEDURE — 85025 COMPLETE CBC W/AUTO DIFF WBC: CPT | Performed by: EMERGENCY MEDICINE

## 2024-11-26 PROCEDURE — 96374 THER/PROPH/DIAG INJ IV PUSH: CPT

## 2024-11-26 PROCEDURE — 72125 CT NECK SPINE W/O DYE: CPT

## 2024-11-26 PROCEDURE — 99285 EMERGENCY DEPT VISIT HI MDM: CPT | Performed by: EMERGENCY MEDICINE

## 2024-11-26 PROCEDURE — 99284 EMERGENCY DEPT VISIT MOD MDM: CPT

## 2024-11-26 PROCEDURE — 71045 X-RAY EXAM CHEST 1 VIEW: CPT

## 2024-11-26 PROCEDURE — 70450 CT HEAD/BRAIN W/O DYE: CPT

## 2024-11-26 PROCEDURE — 36415 COLL VENOUS BLD VENIPUNCTURE: CPT | Performed by: EMERGENCY MEDICINE

## 2024-11-26 PROCEDURE — 80048 BASIC METABOLIC PNL TOTAL CA: CPT | Performed by: EMERGENCY MEDICINE

## 2024-11-26 PROCEDURE — 73000 X-RAY EXAM OF COLLAR BONE: CPT

## 2024-11-26 RX ORDER — ACETAMINOPHEN 325 MG/1
975 TABLET ORAL ONCE
Status: COMPLETED | OUTPATIENT
Start: 2024-11-26 | End: 2024-11-26

## 2024-11-26 RX ORDER — DOCUSATE SODIUM 100 MG/1
100 CAPSULE, LIQUID FILLED ORAL EVERY 12 HOURS
Qty: 60 CAPSULE | Refills: 0 | Status: SHIPPED | OUTPATIENT
Start: 2024-11-26

## 2024-11-26 RX ORDER — OXYCODONE HYDROCHLORIDE 5 MG/1
5 TABLET ORAL ONCE
Refills: 0 | Status: COMPLETED | OUTPATIENT
Start: 2024-11-26 | End: 2024-11-26

## 2024-11-26 RX ORDER — KETOROLAC TROMETHAMINE 30 MG/ML
15 INJECTION, SOLUTION INTRAMUSCULAR; INTRAVENOUS ONCE
Status: COMPLETED | OUTPATIENT
Start: 2024-11-26 | End: 2024-11-26

## 2024-11-26 RX ORDER — OXYCODONE HYDROCHLORIDE 5 MG/1
5 TABLET ORAL EVERY 6 HOURS PRN
Qty: 12 TABLET | Refills: 0 | Status: SHIPPED | OUTPATIENT
Start: 2024-11-26 | End: 2024-12-06

## 2024-11-26 RX ORDER — LIDOCAINE 50 MG/G
1 PATCH TOPICAL ONCE
Status: DISCONTINUED | OUTPATIENT
Start: 2024-11-26 | End: 2024-11-26 | Stop reason: HOSPADM

## 2024-11-26 RX ADMIN — KETOROLAC TROMETHAMINE 15 MG: 30 INJECTION, SOLUTION INTRAMUSCULAR; INTRAVENOUS at 03:32

## 2024-11-26 RX ADMIN — OXYCODONE HYDROCHLORIDE 5 MG: 5 TABLET ORAL at 01:17

## 2024-11-26 RX ADMIN — LIDOCAINE 5% 1 PATCH: 700 PATCH TOPICAL at 01:18

## 2024-11-26 RX ADMIN — ACETAMINOPHEN 975 MG: 325 TABLET, FILM COATED ORAL at 01:17
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
## 2024-11-26 NOTE — DISCHARGE INSTRUCTIONS
Take Acetaminophen 2 extra strength tablets every 4-6 hours up to 3 times daily. Do not exceed 3 g in a 24-hour period   Take Ibuprofen 600 mg every 6-8 hours      For breakthrough pain take Oxycodone 5 mg every 6 hours as needed    If taking Oxycodone, please take stool softeners as you may develop constipation

## 2024-12-10 ENCOUNTER — OFFICE VISIT (OUTPATIENT)
Dept: OBGYN CLINIC | Facility: CLINIC | Age: 59
End: 2024-12-10
Payer: COMMERCIAL

## 2024-12-10 ENCOUNTER — APPOINTMENT (OUTPATIENT)
Dept: RADIOLOGY | Facility: CLINIC | Age: 59
End: 2024-12-10
Payer: COMMERCIAL

## 2024-12-10 VITALS — WEIGHT: 170 LBS | HEIGHT: 70 IN | BODY MASS INDEX: 24.34 KG/M2

## 2024-12-10 DIAGNOSIS — S42.022A DISPLACED FRACTURE OF SHAFT OF LEFT CLAVICLE, INITIAL ENCOUNTER FOR CLOSED FRACTURE: ICD-10-CM
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
## 2024-12-10 DIAGNOSIS — M89.8X1 PAIN OF LEFT CLAVICLE: ICD-10-CM

## 2024-12-10 DIAGNOSIS — S42.022A DISPLACED FRACTURE OF SHAFT OF LEFT CLAVICLE, INITIAL ENCOUNTER FOR CLOSED FRACTURE: Primary | ICD-10-CM

## 2024-12-10 PROCEDURE — 99203 OFFICE O/P NEW LOW 30 MIN: CPT | Performed by: PHYSICIAN ASSISTANT

## 2024-12-10 PROCEDURE — 73000 X-RAY EXAM OF COLLAR BONE: CPT

## 2024-12-10 RX ORDER — HYDROCODONE BITARTRATE AND ACETAMINOPHEN 5; 325 MG/1; MG/1
1 TABLET ORAL EVERY 6 HOURS PRN
Qty: 20 TABLET | Refills: 0 | Status: SHIPPED | OUTPATIENT
Start: 2024-12-10
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
## 2024-12-10 NOTE — PATIENT INSTRUCTIONS
Patient Education     How to Use a Shoulder Sling ED   General Information   You came to the Emergency Department (ED) and the doctors have given you a shoulder sling to help limit movement of your arm and shoulder. A sling keeps your shoulder still and allows it to heal. Some shoulder slings keep your arm resting right near your body. Others use a foam pillow to keep your arm slightly away from the body. How long you will need to wear your shoulder sling is based on the kind of injury you have.  What care is needed at home?   Call your regular doctor to let them know you were in the ED. Make a follow-up appointment with an orthopedic doctor if you were told to.  Rest your shoulder. Try not to use your arm.  When sitting or resting, prop your shoulder and arm on pillows. This may help lessen pain and swelling.  You may want to take medicines like ibuprofen or naproxen for swelling and pain. These are nonsteroidal anti-inflammatory drugs (NSAIDS).  Place an ice pack or a bag of frozen vegetables wrapped in a towel over the painful part. Never put ice right on the skin. Do not leave the ice on more than 10 to 15 minutes at a time. Use for the first 24 to 48 hours after an injury.  Throughout the day, move or wiggle your fingers, hand, and wrist often.  Only remove your shoulder sling if you were told to by the doctor. Depending on your injury, you may need to keep it on at night while you sleep.  When do I need to get emergency help?   Return to the ED if:   You start having any trouble breathing.  When do I need to call the doctor?   You have very bad pain that is not helped by pain medicine.  Your arm is more swollen and painful.  Your fingers are numb, tingly, or blue or grey in color.  You have new or worsening symptoms.  Last Reviewed Date   2021-02-16  Consumer Information Use and Disclaimer   This generalized information is a limited summary of diagnosis, treatment, and/or medication information. It is not  meant to be comprehensive and should be used as a tool to help the user understand and/or assess potential diagnostic and treatment options. It does NOT include all information about conditions, treatments, medications, side effects, or risks that may apply to a specific patient. It is not intended to be medical advice or a substitute for the medical advice, diagnosis, or treatment of a health care provider based on the health care provider's examination and assessment of a patient’s specific and unique circumstances. Patients must speak with a health care provider for complete information about their health, medical questions, and treatment options, including any risks or benefits regarding use of medications. This information does not endorse any treatments or medications as safe, effective, or approved for treating a specific patient. Up[employer], [employer] and its affiliates disclaim any warranty or liability relating to this information or the use thereof. The use of this information is governed by the Terms of Use, available at https://www.woltersHardMetricser.com/en/know/clinical-effectiveness-terms   Copyright   Copyright © 2024 UpToDate, [employer] and its affiliates and/or licensors. All rights reserved.

## 2024-12-10 NOTE — LETTER
December 10, 2024     Patient: Devin Jiménez  YOB: 1965  Date of Visit: 12/10/2024      To Whom it May Concern:    Devin Jiménez is under my professional care. Devin was seen in my office on 12/10/2024. Devin should be on light duty restrictions: No use left arm must wear sling.  If light duty is unavailable patient would be out of work until next evaluation with shoulder surgeon.    If you have any questions or concerns, please don't hesitate to call.         Sincerely,          Jacob Peters PA-C        CC: No Recipients

## 2024-12-24 ENCOUNTER — TELEPHONE (OUTPATIENT)
Dept: PSYCHIATRY | Facility: CLINIC | Age: 59
End: 2024-12-24

## 2024-12-24 DIAGNOSIS — S42.022A DISPLACED FRACTURE OF SHAFT OF LEFT CLAVICLE, INITIAL ENCOUNTER FOR CLOSED FRACTURE: ICD-10-CM

## 2024-12-24 DIAGNOSIS — M89.8X1 PAIN OF LEFT CLAVICLE: ICD-10-CM

## 2024-12-24 NOTE — TELEPHONE ENCOUNTER
Patient called upset that pharm is closing in 30 minutes. He is in pain  If meds not called in now, please try [facility] and call patient to let him know where to .

## 2024-12-24 NOTE — TELEPHONE ENCOUNTER
Patient called to check status. Advised it is pending. He is upset it still has not been called in yet

## 2024-12-24 NOTE — TELEPHONE ENCOUNTER
Spoke to  Devin that we will need the [facility] form signed and others. This is in order to have his upcoming virtual visit with Dr. Salazar on 1/10/25. He was agreeable to this.

## 2024-12-24 NOTE — TELEPHONE ENCOUNTER
Reason for call:   [x] Refill   [] Prior Auth  [] Other:     Office:   [] PCP/Provider -   [x] Specialty/Provider - Ortho    Medication: HYDROcodone-acetaminophen (Norco) 5-325 mg     Dose/Frequency: Take 1 tablet by mouth every 6 (six) hours as needed     Quantity: 20    Pharmacy: [place] [id number] - [facility] - [address]     Does the patient have enough for 3 days?   [] Yes   [x] No - Send as HP to POD

## 2024-12-28 RX ORDER — HYDROCODONE BITARTRATE AND ACETAMINOPHEN 5; 325 MG/1; MG/1
1 TABLET ORAL EVERY 6 HOURS PRN
Qty: 20 TABLET | Refills: 0 | OUTPATIENT
Start: 2024-12-28

## 2024-12-28 NOTE — TELEPHONE ENCOUNTER
Patient was advised to follow-up with orthopedic surgeon to discuss surgery and did not.  He has no scheduled follow-ups.

## 2025-01-04 ENCOUNTER — TELEPHONE (OUTPATIENT)
Dept: OTHER | Facility: OTHER | Age: 60
End: 2025-01-04

## 2025-01-04 NOTE — TELEPHONE ENCOUNTER
Medication Refill Request     Name  HYDROcodone-acetaminophen (Norco) 5-325 mg    Dose/Frequency Take 1 tablet by mouth every 6 (six) hours as needed   Quantity 20  Verified pharmacy   [x]  Verified ordering Provider   [x]  Does patient have enough for the next 3 days? Yes [] No [x]

## 2025-01-04 NOTE — TELEPHONE ENCOUNTER
Patient requesting pain medication, along with appointment. S/p clavicle fracture. Verbalized understanding. No further questions.

## 2025-01-10 ENCOUNTER — TELEMEDICINE (OUTPATIENT)
Dept: PSYCHIATRY | Facility: CLINIC | Age: 60
End: 2025-01-10

## 2025-01-10 ENCOUNTER — TELEPHONE (OUTPATIENT)
Dept: PSYCHIATRY | Facility: CLINIC | Age: 60
End: 2025-01-10

## 2025-01-10 DIAGNOSIS — Z91.199 NO-SHOW FOR APPOINTMENT: Primary | ICD-10-CM

## 2025-01-10 NOTE — TELEPHONE ENCOUNTER
I left a voicemail that Devin has not signed his virtual care consent form in his my chart. He cannot have the virtual with Dr. Salazar without this form signed. I also let him know the [facility] NP forms should be signed. I have also sent a few visit messages as well today. He has not responded yet....

## 2025-01-30 ENCOUNTER — TELEPHONE (OUTPATIENT)
Dept: PSYCHIATRY | Facility: CLINIC | Age: 60
End: 2025-01-30

## 2025-02-14 DIAGNOSIS — F31.60 BIPOLAR I DISORDER, MOST RECENT EPISODE MIXED (HCC): ICD-10-CM

## 2025-02-14 RX ORDER — MIRTAZAPINE 30 MG/1
30 TABLET, FILM COATED ORAL
Qty: 30 TABLET | Refills: 1 | Status: SHIPPED | OUTPATIENT
Start: 2025-02-14

## 2025-02-14 RX ORDER — TRAZODONE HYDROCHLORIDE 100 MG/1
100 TABLET ORAL
Qty: 30 TABLET | Refills: 1 | Status: SHIPPED | OUTPATIENT
Start: 2025-02-14

## 2025-02-18 ENCOUNTER — TELEPHONE (OUTPATIENT)
Dept: PSYCHIATRY | Facility: CLINIC | Age: 60
End: 2025-02-18

## 2025-02-18 NOTE — TELEPHONE ENCOUNTER
Scheduled appointment cancelled with Dr. Enamorado today. She is not feeling well. Unable to reach patient. Both numbers were unreachable. One was out of service, the other just didn't let leave a message.

## 2025-03-13 ENCOUNTER — TELEPHONE (OUTPATIENT)
Dept: PSYCHIATRY | Facility: CLINIC | Age: 60
End: 2025-03-13

## 2025-03-13 NOTE — TELEPHONE ENCOUNTER
Called pt and left VM regarding scheduling f/u appt with provider and to see if pt is still interested in med mgmt services.    Gave [facility] phone number to call back and schedule as soon as possible with Dr. Salazar if still interested.

## 2025-03-17 NOTE — TELEPHONE ENCOUNTER
Patient contacted the office to schedule a follow up visit with provider. Patient is now scheduled for 3/18/25  at 3:30pm virtually.

## 2025-03-18 ENCOUNTER — TELEMEDICINE (OUTPATIENT)
Dept: PSYCHIATRY | Facility: CLINIC | Age: 60
End: 2025-03-18

## 2025-03-18 ENCOUNTER — TELEPHONE (OUTPATIENT)
Dept: PSYCHIATRY | Facility: CLINIC | Age: 60
End: 2025-03-18

## 2025-03-18 DIAGNOSIS — Z91.199 NO-SHOW FOR APPOINTMENT: Primary | ICD-10-CM

## 2025-03-18 PROCEDURE — NOSHOW: Performed by: PSYCHIATRY & NEUROLOGY

## 2025-03-18 NOTE — TELEPHONE ENCOUNTER
Spoke to Dvein. Let him know the [facility] Behavioral Consent form MUST be signed in order to have his visit with Dr. Salazar today. He was agreeable.

## 2025-03-19 ENCOUNTER — TELEPHONE (OUTPATIENT)
Dept: PSYCHIATRY | Facility: CLINIC | Age: 60
End: 2025-03-19

## 2025-03-19 NOTE — TELEPHONE ENCOUNTER
Called pt and left VM regarding r/s f/u appt for Dr. Salazar.    Gave [facility] phone number to call back and r/s with provider.

## 2025-03-24 ENCOUNTER — TELEPHONE (OUTPATIENT)
Dept: PSYCHIATRY | Facility: CLINIC | Age: 60
End: 2025-03-24

## 2025-04-03 ENCOUNTER — TELEPHONE (OUTPATIENT)
Age: 60
End: 2025-04-03

## 2025-04-03 NOTE — TELEPHONE ENCOUNTER
Patient contacted the office to schedule a follow up visit with provider. Patient is now scheduled for 4/15/2025  at 3:30 pm virtually.

## 2025-04-15 ENCOUNTER — TELEMEDICINE (OUTPATIENT)
Dept: PSYCHIATRY | Facility: CLINIC | Age: 60
End: 2025-04-15
Payer: COMMERCIAL

## 2025-04-15 DIAGNOSIS — F31.60 BIPOLAR I DISORDER, MOST RECENT EPISODE MIXED (HCC): Primary | ICD-10-CM

## 2025-04-15 DIAGNOSIS — G47.00 INSOMNIA, UNSPECIFIED TYPE: ICD-10-CM

## 2025-04-15 DIAGNOSIS — F12.90 CANNABIS USE DISORDER: ICD-10-CM

## 2025-04-15 PROCEDURE — 99213 OFFICE O/P EST LOW 20 MIN: CPT | Performed by: PSYCHIATRY & NEUROLOGY

## 2025-04-15 RX ORDER — TRAZODONE HYDROCHLORIDE 100 MG/1
100 TABLET ORAL
Qty: 30 TABLET | Refills: 2 | Status: SHIPPED | OUTPATIENT
Start: 2025-04-15

## 2025-04-15 RX ORDER — ARIPIPRAZOLE 10 MG/1
10 TABLET ORAL EVERY MORNING
Qty: 30 TABLET | Refills: 2 | Status: SHIPPED | OUTPATIENT
Start: 2025-04-15

## 2025-04-15 RX ORDER — MIRTAZAPINE 30 MG/1
30 TABLET, FILM COATED ORAL
Qty: 30 TABLET | Refills: 2 | Status: SHIPPED | OUTPATIENT
Start: 2025-04-15

## 2025-04-15 NOTE — BH CRISIS PLAN
Client Name: Devin Jiménez       Client YOB: 1965    Jeanne Safety Plan      Creation Date: 4/15/25    Created By: Aydee Salazar MD       Step 1: Warning Signs:   Warning Signs   anxiety   depression            Step 2: Internal Coping Strategies:   Internal Coping Strategies   art            Step 3: People and social settings that provide distraction:   Name Contact Information                Step 4: People whom I can ask for help during a crisis:      Name Contact Information           Step 5: Professionals or agencies I can contact during a crisis:      Clinican/Agency Name Phone Emergency Contact    [facility]          Crisis Phone Numbers:   Suicide Prevention Lifeline: Call or Text  988 Crisis Text Line: Text HOME to 940-439   Please note: Some [facility] do not have a separate number for Child/Adolescent specific crisis. If your county is not listed under Child/Adolescent, please call the adult number for your county      Adult Crisis Numbers: Child/Adolescent Crisis Numbers   [facility]: [phone] [facility]: [phone]   [facility]: [phone] [facility]: [phone]   [facility]: [phone] [place]: [phone]   [facility]: [phone] [place]: [phone]   [facility]: [phone]   [facility]: [phone]   [facility]: [phone]   [facility]: [phone] (daytime) [phone] (after hours, weekends, holidays)      Step 6: Making the environment safer (plan for lethal means safety):      Optional: What is most important to me and worth living for?      Jeanne Safety Plan. Maggie Narayanan and Gage Farias. Used with permission of the authors.

## 2025-04-15 NOTE — PSYCH
Administrative Statements   Encounter provider Aydee Salazar MD    The Patient is located at Home and in the following state in which I hold an active license [place].    The patient was identified by name and date of birth. Devin Jiménez was informed that this is a telemedicine visit and that the visit is being conducted through the telephone. He agrees to proceed..  My office door was closed. No one else was in the room.  He acknowledged consent and understanding of privacy and security of the video platform. The patient has agreed to participate and understands they can discontinue the visit at any time.    I have spent a total time of 25 minutes in caring for this patient on the day of the visit/encounter including Counseling / Coordination of care, Documenting in the medical record, Reviewing/placing orders in the medical record (including tests, medications, and/or procedures), and Obtaining or reviewing history  , not including the time spent for establishing the audio/video connection.      Chief Complaint:    Bipolar depression and insomnia    Interval Summary    Pt said that he is not doing good in the last few months. He said that he has issues with his SSI and has financial issues. He feels depressed and anxious due to these stressors, few times a week and feels overwhelmed at times. He said that he keeps himself with his art work, watching TV and plays pool once a week.     Psychiatric ROS    Sleep: Good, 10-12 hours, feels rested  Appetite: Good  Suicidal ideation: Denied  Homicidal ideation: Denied  Psychosis: None reported  Codi: None reported  PTSD: None reported  Panic attacks: None reported  Memory issues: None reported  Med compliance: Good  Side effects: None reported    D+A Use    Alcohol: Drinks a beer, once a month  Nicotine: None  Other substances: Smokes MJ few hits in a week, none in two months    Medical Issues  Acute medical issues at present: None  Last PCP Visit: More than one  "year ago    Medical Review of Systems    No fever/dizziness/blurred vision/cough/chest pain/shortness of breath/abdominal pain/nausea/vomiting/bladder or bowel problems/headache/weakness/rigidity or pain reported. Any symptoms reported by patient are listed in the interval summary.    Mental Status Exam    Motor: Normal gait and no abnormal movements  General appearance and behavior: Moderately kempt, calm, cooperative, pleasant, good eye contact, good rapport, no psychomotor abnormalities noted  Speech: Spontaneous with normal rate, normal volume and normal tone  Mood: \"Stressed\"  Affect: Euthymic, congruent with mood, normal range, appropriate  Thought Process: linear  Thought content: Denied suicidal or homicidal ideation. No delusions elicited  Perception: Denied any auditory or visual hallucinations  Insight: Good  Judgment: Good  Orientation: Oriented to person, place and time  Attention/Concentration: Good  Memory - Grossly intact  Language - Fluent  Fund of Knowledge - Adequate    Assessment and Plan    Bipolar I disorder, recent episode depression, moderate    - Continue Abilify 10 mg Po daily    - Continue mirtazapine 30 mg PO HS    Insomnia unspecified - Continue trazodone 100 mg PO HS PRN sleep    Cannabis use disorder - Continue monitor    The clinical diagnosis, course and prognosis were explained to the patient. Discussed with patient the clinical indications, interactions, benefits, the most common and serious side effects of all current medications. The alternative treatment options were discussed. The importance of continuing in psychotherapy was reinstated. The patient was receptive and appeared to understand the information provided. Patient's concerns and questions were addressed to patient's satisfaction during the appointment, and the patient agreed to the treatment plan.    Patient is aware of adverse effects of use of alcohol and/or other substances on mental illness and is aware of the risk " of combining alcohol and/or substances with the medications that are currently prescribed. The patient was advised go to the nearest emergency room or call 911 if new symptoms arise or existing symptoms worsen or has thoughts of hurting self or others.    Follow up in 8 weeks    Start time: 335 pm    Stop time: 400 pm

## 2025-04-15 NOTE — BH TREATMENT PLAN
TREATMENT PLAN (Medication Management Only)        [facility]    Name and Date of Birth:  Devin Jiménez 60 y.o. 1965  MRN: [id number]  Date of Treatment Plan: April 15, 2025  Diagnosis/Diagnoses:    1. Bipolar I disorder, most recent episode mixed (HCC)    2. Insomnia, unspecified type    3. Cannabis use disorder      Strengths/Personal Resources for Self-Care: taking medications as prescribed, ability to communicate needs.  Area/Areas of need (in own words): anxiety  1. Long Term Goal:   continue improvement in anxiety.  Target Date:6 months - 10/15/2025  Person/Persons responsible for completion of goal: Devin  2.  Short Term Objective (s) - How will we reach this goal?:   A.  Provider new recommended medication/dosage changes and/or continue medication(s): continue current medications as prescribed.  B.  N/A.  C.  N/A.  Target Date:6 months - 10/15/2025  Person/Persons Responsible for Completion of Goal: Devin  Progress Towards Goals: continuing treatment  Treatment Modality: medication management every 2 months  Review due 180 days from date of this plan: 6 months - 10/15/2025  Expected length of service: ongoing treatment unless revised  My Physician/PA/NP and I have developed this plan together and I agree to work on the goals and objectives. I understand the treatment goals that were developed for my treatment.   Electronic Signatures: on file (unless signed below)    Aydee Salazar MD 04/15/25

## 2025-05-13 ENCOUNTER — TELEPHONE (OUTPATIENT)
Dept: PSYCHIATRY | Facility: CLINIC | Age: 60
End: 2025-05-13

## 2025-05-13 NOTE — TELEPHONE ENCOUNTER
LVM with information aobut coming into the ofifce to sign release of information forms for records

## 2025-05-27 ENCOUNTER — TELEPHONE (OUTPATIENT)
Age: 60
End: 2025-05-27

## 2025-05-27 NOTE — TELEPHONE ENCOUNTER
Pt returning a call to [facility], pt believes it may have been a confirmation call for an upcoming appt. Upon review, writer verified pt received reminder call for appt, 6/10/25 at 6PM - virtual. Pt advised.    no discoloration/no numbness/warm/no tingling

## 2025-06-10 ENCOUNTER — TELEMEDICINE (OUTPATIENT)
Dept: PSYCHIATRY | Facility: CLINIC | Age: 60
End: 2025-06-10
Payer: COMMERCIAL

## 2025-06-10 DIAGNOSIS — G47.00 INSOMNIA, UNSPECIFIED TYPE: ICD-10-CM

## 2025-06-10 DIAGNOSIS — F31.60 BIPOLAR I DISORDER, MOST RECENT EPISODE MIXED (HCC): Primary | ICD-10-CM

## 2025-06-10 DIAGNOSIS — F12.90 CANNABIS USE DISORDER: ICD-10-CM

## 2025-06-10 PROCEDURE — 99213 OFFICE O/P EST LOW 20 MIN: CPT | Performed by: PSYCHIATRY & NEUROLOGY

## 2025-06-10 RX ORDER — ARIPIPRAZOLE 10 MG/1
10 TABLET ORAL EVERY MORNING
Qty: 30 TABLET | Refills: 3 | Status: SHIPPED | OUTPATIENT
Start: 2025-06-10

## 2025-06-10 RX ORDER — TRAZODONE HYDROCHLORIDE 100 MG/1
100 TABLET ORAL
Qty: 30 TABLET | Refills: 3 | Status: SHIPPED | OUTPATIENT
Start: 2025-06-10

## 2025-06-10 RX ORDER — MIRTAZAPINE 30 MG/1
30 TABLET, FILM COATED ORAL
Qty: 30 TABLET | Refills: 3 | Status: SHIPPED | OUTPATIENT
Start: 2025-06-10

## 2025-06-10 NOTE — PSYCH
Administrative Statements   Encounter provider Aydee Salazar MD    The Patient is located at Home and in the following state in which I hold an active license [place].    The patient was identified by name and date of birth. Devin Jiménez was informed that this is a telemedicine visit and that the visit is being conducted through the telephone. He agrees to proceed..  My office door was closed. No one else was in the room.  He acknowledged consent and understanding of privacy and security of the video platform. The patient has agreed to participate and understands they can discontinue the visit at any time.    I have spent a total time of 20 minutes in caring for this patient on the day of the visit/encounter including Counseling / Coordination of care, Documenting in the medical record, Reviewing/placing orders in the medical record (including tests, medications, and/or procedures), and Obtaining or reviewing history  , not including the time spent for establishing the audio/video connection.      Chief Complaint:    Bipolar depression and insomnia    Interval Summary    Pt said that he is not doing good in the last few months. He said that he wants to get out of the house he is living now. He rents a room and and he does not trust his house mates as things are missing from his room and he does not feel safe for himself or his dog. He is looking for another place to move as soon as possible. He said that he receives social security and is on a waiting list for low income housing. He reported feeling anxious and depressed most days, and feels overwhelmed most of the time. He said he is worried about the safety of his dog and himself as his [place] main road.    Psychiatric ROS    Sleep: Disturbed, 5-7 hours, feels tired, naps during the day  Appetite: Good  Suicidal ideation: Denied  Homicidal ideation: Denied  Psychosis: None reported  Codi: None reported  PTSD: None reported  Panic attacks: None  "reported  Memory issues: None reported  Med compliance: Good  Side effects: None reported    D+A Use    Alcohol: Drinks a beer, once a month  Nicotine: None  Other substances: Smokes MJ few hits in a week    Medical Issues  Acute medical issues at present: None  Last PCP Visit: More than one year ago    Medical Review of Systems    C/o SOB from COPD at times    No fever/dizziness/blurred vision/cough/chest pain/shortness of breath/abdominal pain/nausea/vomiting/bladder or bowel problems/headache/weakness/rigidity or pain reported. Any symptoms reported by patient are listed in the interval summary.    Mental Status Exam    Motor: Normal gait and no abnormal movements  General appearance and behavior: Moderately kempt, calm, cooperative, pleasant, good eye contact, good rapport, no psychomotor abnormalities noted  Speech: Spontaneous with normal rate, normal volume and normal tone  Mood: \"Anxious\"  Affect: Euthymic, congruent with mood, normal range, appropriate  Thought Process: linear  Thought content: Denied suicidal or homicidal ideation. No delusions elicited  Perception: Denied any auditory or visual hallucinations  Insight: Good  Judgment: Good  Orientation: Oriented to person, place and time  Attention/Concentration: Good  Memory - Grossly intact  Language - Fluent  Fund of Knowledge - Adequate    Assessment and Plan    Bipolar I disorder, recent episode depression, moderate    - Continue Abilify 10 mg PO daily    - Continue mirtazapine 30 mg PO HS    Insomnia unspecified - Continue trazodone 100 mg PO HS PRN sleep    Cannabis use disorder - Continue monitor    The clinical diagnosis, course and prognosis were explained to the patient. Discussed with patient the clinical indications, interactions, benefits, the most common and serious side effects of all current medications. The alternative treatment options were discussed. The importance of continuing in psychotherapy was reinstated. The patient was " receptive and appeared to understand the information provided. Patient's concerns and questions were addressed to patient's satisfaction during the appointment, and the patient agreed to the treatment plan.    Patient is aware of adverse effects of use of alcohol and/or other substances on mental illness and is aware of the risk of combining alcohol and/or substances with the medications that are currently prescribed. The patient was advised go to the nearest emergency room or call 911 if new symptoms arise or existing symptoms worsen or has thoughts of hurting self or others.    Follow up in 12 weeks    Start time: 520 pm    Stop time: 540 pm

## 2025-06-11 ENCOUNTER — TELEPHONE (OUTPATIENT)
Dept: PSYCHIATRY | Facility: CLINIC | Age: 60
End: 2025-06-11

## 2025-06-11 NOTE — TELEPHONE ENCOUNTER
left  message for patient about scheduling a 12 week follow up appointment with Dr. Salazar.  provided the phone number for the access center for patient to call to schedule.

## 2025-07-03 ENCOUNTER — TELEPHONE (OUTPATIENT)
Age: 60
End: 2025-07-03

## 2025-07-03 NOTE — TELEPHONE ENCOUNTER
Patient called in to request medical records be sent over to [facility], as patient is moving. Patient provided fax number of [phone]. Patient is requesting medical records and evaluations be sent over.    Writer advised patient that the first step would be to come in person to any of our behavioral health locations to sign a release of information form, which is needed in order to send any records. Patient acknowledged and confirmed days that the offices are open.

## 2025-07-18 ENCOUNTER — HOSPITAL ENCOUNTER (EMERGENCY)
Facility: HOSPITAL | Age: 60
Discharge: HOME | End: 2025-07-18
Attending: EMERGENCY MEDICINE
Payer: COMMERCIAL

## 2025-07-18 ENCOUNTER — APPOINTMENT (EMERGENCY)
Dept: CT IMAGING | Facility: HOSPITAL | Age: 60
End: 2025-07-18
Payer: COMMERCIAL

## 2025-07-18 ENCOUNTER — APPOINTMENT (EMERGENCY)
Dept: RADIOLOGY | Facility: HOSPITAL | Age: 60
End: 2025-07-18
Payer: COMMERCIAL

## 2025-07-18 VITALS
OXYGEN SATURATION: 95 % | DIASTOLIC BLOOD PRESSURE: 78 MMHG | HEART RATE: 94 BPM | SYSTOLIC BLOOD PRESSURE: 128 MMHG | RESPIRATION RATE: 19 BRPM | TEMPERATURE: 97.9 F

## 2025-07-18 DIAGNOSIS — R73.9 HYPERGLYCEMIA: ICD-10-CM

## 2025-07-18 DIAGNOSIS — K74.60 CIRRHOSIS OF LIVER (HCC): Primary | ICD-10-CM

## 2025-07-18 DIAGNOSIS — R19.7 DIARRHEA: ICD-10-CM

## 2025-07-18 DIAGNOSIS — G92.9 TOXIC ENCEPHALOPATHY: ICD-10-CM

## 2025-07-18 DIAGNOSIS — R11.2 NAUSEA AND VOMITING: ICD-10-CM

## 2025-07-18 LAB
ALBUMIN SERPL BCG-MCNC: 4.5 G/DL (ref 3.5–5)
ALP SERPL-CCNC: 174 U/L (ref 34–104)
ALT SERPL W P-5'-P-CCNC: 14 U/L (ref 7–52)
AMMONIA PLAS-SCNC: 17 UMOL/L (ref 18–72)
AMPHETAMINES SERPL QL SCN: POSITIVE
ANION GAP SERPL CALCULATED.3IONS-SCNC: 8 MMOL/L (ref 4–13)
APAP SERPL-MCNC: <2 UG/ML (ref 10–20)
APTT PPP: 24 SECONDS (ref 23–34)
AST SERPL W P-5'-P-CCNC: 23 U/L (ref 13–39)
ATRIAL RATE: 90 BPM
BARBITURATES UR QL: NEGATIVE
BASOPHILS # BLD AUTO: 0.02 THOUSANDS/ÂΜL (ref 0–0.1)
BASOPHILS NFR BLD AUTO: 0 % (ref 0–1)
BENZODIAZ UR QL: NEGATIVE
BILIRUB SERPL-MCNC: 1.38 MG/DL (ref 0.2–1)
BILIRUB UR QL STRIP: NEGATIVE
BNP SERPL-MCNC: 23 PG/ML (ref 0–100)
BUN SERPL-MCNC: 22 MG/DL (ref 5–25)
CALCIUM SERPL-MCNC: 9.6 MG/DL (ref 8.4–10.2)
CARDIAC TROPONIN I PNL SERPL HS: 3 NG/L (ref ?–50)
CHLORIDE SERPL-SCNC: 103 MMOL/L (ref 96–108)
CLARITY UR: CLEAR
CO2 SERPL-SCNC: 24 MMOL/L (ref 21–32)
COCAINE UR QL: NEGATIVE
COLOR UR: YELLOW
CREAT SERPL-MCNC: 1.02 MG/DL (ref 0.6–1.3)
D DIMER PPP FEU-MCNC: <0.27 UG/ML FEU
EOSINOPHIL # BLD AUTO: 0.01 THOUSAND/ÂΜL (ref 0–0.61)
EOSINOPHIL NFR BLD AUTO: 0 % (ref 0–6)
ERYTHROCYTE [DISTWIDTH] IN BLOOD BY AUTOMATED COUNT: 12.1 % (ref 11.6–15.1)
ETHANOL SERPL-MCNC: <10 MG/DL
FENTANYL UR QL SCN: NEGATIVE
GFR SERPL CREATININE-BSD FRML MDRD: 78 ML/MIN/1.73SQ M
GLUCOSE SERPL-MCNC: 178 MG/DL (ref 65–140)
GLUCOSE SERPL-MCNC: 194 MG/DL (ref 65–140)
GLUCOSE UR STRIP-MCNC: NEGATIVE MG/DL
HCT VFR BLD AUTO: 53.4 % (ref 36.5–49.3)
HGB BLD-MCNC: 16.9 G/DL (ref 12–17)
HGB UR QL STRIP.AUTO: NEGATIVE
HYDROCODONE UR QL SCN: NEGATIVE
IMM GRANULOCYTES # BLD AUTO: 0.06 THOUSAND/UL (ref 0–0.2)
IMM GRANULOCYTES NFR BLD AUTO: 1 % (ref 0–2)
INR PPP: 1.01 (ref 0.85–1.19)
KETONES UR STRIP-MCNC: NEGATIVE MG/DL
LACTATE SERPL-SCNC: 1.1 MMOL/L (ref 0.5–2)
LEUKOCYTE ESTERASE UR QL STRIP: NEGATIVE
LIPASE SERPL-CCNC: 32 U/L (ref 11–82)
LYMPHOCYTES # BLD AUTO: 0.65 THOUSANDS/ÂΜL (ref 0.6–4.47)
LYMPHOCYTES NFR BLD AUTO: 9 % (ref 14–44)
MCH RBC QN AUTO: 29.8 PG (ref 26.8–34.3)
MCHC RBC AUTO-ENTMCNC: 31.6 G/DL (ref 31.4–37.4)
MCV RBC AUTO: 94 FL (ref 82–98)
METHADONE UR QL: NEGATIVE
MONOCYTES # BLD AUTO: 0.21 THOUSAND/ÂΜL (ref 0.17–1.22)
MONOCYTES NFR BLD AUTO: 3 % (ref 4–12)
NEUTROPHILS # BLD AUTO: 5.93 THOUSANDS/ÂΜL (ref 1.85–7.62)
NEUTS SEG NFR BLD AUTO: 87 % (ref 43–75)
NITRITE UR QL STRIP: NEGATIVE
NRBC BLD AUTO-RTO: 0 /100 WBCS
OPIATES UR QL SCN: NEGATIVE
OXYCODONE+OXYMORPHONE UR QL SCN: NEGATIVE
P AXIS: 51 DEGREES
PCP UR QL: NEGATIVE
PH UR STRIP.AUTO: 5.5 [PH]
PLATELET # BLD AUTO: 218 THOUSANDS/UL (ref 149–390)
PMV BLD AUTO: 9.2 FL (ref 8.9–12.7)
POTASSIUM SERPL-SCNC: 4.9 MMOL/L (ref 3.5–5.3)
PR INTERVAL: 138 MS
PROT SERPL-MCNC: 7.4 G/DL (ref 6.4–8.4)
PROT UR STRIP-MCNC: NEGATIVE MG/DL
PROTHROMBIN TIME: 13.8 SECONDS (ref 12.3–15)
QRS AXIS: 70 DEGREES
QRSD INTERVAL: 76 MS
QT INTERVAL: 366 MS
QTC INTERVAL: 447 MS
RBC # BLD AUTO: 5.68 MILLION/UL (ref 3.88–5.62)
SALICYLATES SERPL-MCNC: <5 MG/DL (ref 5–20)
SODIUM SERPL-SCNC: 135 MMOL/L (ref 135–147)
SP GR UR STRIP.AUTO: 1.01 (ref 1–1.03)
T WAVE AXIS: 72 DEGREES
THC UR QL: NEGATIVE
UROBILINOGEN UR STRIP-ACNC: <2 MG/DL
VENTRICULAR RATE: 90 BPM
WBC # BLD AUTO: 6.88 THOUSAND/UL (ref 4.31–10.16)

## 2025-07-18 PROCEDURE — 84484 ASSAY OF TROPONIN QUANT: CPT

## 2025-07-18 PROCEDURE — 99285 EMERGENCY DEPT VISIT HI MDM: CPT

## 2025-07-18 PROCEDURE — 83690 ASSAY OF LIPASE: CPT

## 2025-07-18 PROCEDURE — 83605 ASSAY OF LACTIC ACID: CPT

## 2025-07-18 PROCEDURE — 70450 CT HEAD/BRAIN W/O DYE: CPT

## 2025-07-18 PROCEDURE — 93005 ELECTROCARDIOGRAM TRACING: CPT

## 2025-07-18 PROCEDURE — 80307 DRUG TEST PRSMV CHEM ANLYZR: CPT

## 2025-07-18 PROCEDURE — 82077 ASSAY SPEC XCP UR&BREATH IA: CPT

## 2025-07-18 PROCEDURE — 99284 EMERGENCY DEPT VISIT MOD MDM: CPT

## 2025-07-18 PROCEDURE — 82140 ASSAY OF AMMONIA: CPT

## 2025-07-18 PROCEDURE — 83880 ASSAY OF NATRIURETIC PEPTIDE: CPT

## 2025-07-18 PROCEDURE — 93010 ELECTROCARDIOGRAM REPORT: CPT | Performed by: INTERNAL MEDICINE

## 2025-07-18 PROCEDURE — 82948 REAGENT STRIP/BLOOD GLUCOSE: CPT

## 2025-07-18 PROCEDURE — 85610 PROTHROMBIN TIME: CPT

## 2025-07-18 PROCEDURE — 71045 X-RAY EXAM CHEST 1 VIEW: CPT

## 2025-07-18 PROCEDURE — 80053 COMPREHEN METABOLIC PANEL: CPT

## 2025-07-18 PROCEDURE — 96365 THER/PROPH/DIAG IV INF INIT: CPT

## 2025-07-18 PROCEDURE — 85379 FIBRIN DEGRADATION QUANT: CPT

## 2025-07-18 PROCEDURE — 85730 THROMBOPLASTIN TIME PARTIAL: CPT

## 2025-07-18 PROCEDURE — 80143 DRUG ASSAY ACETAMINOPHEN: CPT

## 2025-07-18 PROCEDURE — 36415 COLL VENOUS BLD VENIPUNCTURE: CPT

## 2025-07-18 PROCEDURE — 81003 URINALYSIS AUTO W/O SCOPE: CPT

## 2025-07-18 PROCEDURE — 74177 CT ABD & PELVIS W/CONTRAST: CPT

## 2025-07-18 PROCEDURE — 85025 COMPLETE CBC W/AUTO DIFF WBC: CPT

## 2025-07-18 PROCEDURE — 80179 DRUG ASSAY SALICYLATE: CPT

## 2025-07-18 PROCEDURE — 96375 TX/PRO/DX INJ NEW DRUG ADDON: CPT

## 2025-07-18 PROCEDURE — 96361 HYDRATE IV INFUSION ADD-ON: CPT

## 2025-07-18 RX ORDER — ONDANSETRON 2 MG/ML
4 INJECTION INTRAMUSCULAR; INTRAVENOUS ONCE
Status: COMPLETED | OUTPATIENT
Start: 2025-07-18 | End: 2025-07-18

## 2025-07-18 RX ORDER — ONDANSETRON 4 MG/1
4 TABLET, FILM COATED ORAL EVERY 6 HOURS
Qty: 20 TABLET | Refills: 0 | Status: SHIPPED | OUTPATIENT
Start: 2025-07-18

## 2025-07-18 RX ADMIN — SODIUM CHLORIDE 1000 ML: 0.9 INJECTION, SOLUTION INTRAVENOUS at 08:04

## 2025-07-18 RX ADMIN — ONDANSETRON 4 MG: 2 INJECTION INTRAMUSCULAR; INTRAVENOUS at 08:05

## 2025-07-18 RX ADMIN — IOHEXOL 100 ML: 350 INJECTION, SOLUTION INTRAVENOUS at 08:47

## 2025-07-18 RX ADMIN — SODIUM CHLORIDE, SODIUM LACTATE, POTASSIUM CHLORIDE, AND CALCIUM CHLORIDE 1000 ML: .6; .31; .03; .02 INJECTION, SOLUTION INTRAVENOUS at 09:26

## 2025-07-18 NOTE — ED PROVIDER NOTES
Time reflects when diagnosis was documented in both MDM as applicable and the Disposition within this note       Time User Action Codes Description Comment    7/18/2025  9:49 AM Geoffrey Ortiz [K74.60] Cirrhosis of liver (HCC)     7/18/2025  9:50 AM Geoffrey Ortiz [R19.7] Diarrhea     7/18/2025 10:38 AM Geoffrey Ortiz [R73.9] Hyperglycemia     7/18/2025 10:39 AM Geoffrey Ortiz [R11.2] Nausea and vomiting     7/18/2025 11:24 AM Geoffrey Ortiz [G92.9] Toxic encephalopathy           ED Disposition       ED Disposition   Discharge    Condition   Stable    Date/Time   Fri Jul 18, 2025 10:39 AM    Comment   Devin Jiménez discharge to home/self care.                   Assessment & Plan       Medical Decision Making  Assessment  This is a 60 y.o. male presenting today with concerns of abd pain, N/V/D/SOB. Patient does admit to meth use. Thinks that his meth may have been laced.    Differential: Differential includes but is not limited to appendicitis, cholecystitis, bowel obstruction, pancreatitis, gastritis, colitis, PUD/gastritis, diverticulitis, mesenteric ischemia, colonic perforation, DKA, IBS, musculoskeletal injury, among others.  Differential includes but is not limited to: pulmonary embolism, pneumothorax, viral URI, cardiac arrhythmia, pneumonia, allergies/anaphylaxis, asthma/COPD, pericarditis, electrolyte abnormalities, sepsis, DKA, anxiety/panic, among others.    Plan  Labs.  EKG  Patient hypothermic, initiate bare hugger to rewarm.  CT head for AMS.  CT abd pelv for N/V/D/abd pain.  EKG was reviewed by me, please see above documentation.  Labs unremarkable.  CT with diarrheal disease and known cirrhosis of liver.  Patient able to tolerate PO. Feeling much better.  Will send for home zofran.  Follow up with family doctor.  Suspected altered status at beginning of evaluation secondary to meth use.  ------------------------------------------------------------  Strict return precautions  "discussed. Patient at time of discharge well-appearing in no acute distress, all questions answered. Patient agreeable to plan.  Patient's vitals, lab/imaging results, diagnosis, and treatment plan were discussed with the patient. All new/changed medications were discussed with patient, specifically, route of administration, how often and when to take, and where they can be picked up. Strict return precautions as well as close follow up with PCP was discussed with the patient and the patient was agreeable to my recommendations.  Patient verbally acknowledged understanding of the above communications. All labs reviewed and utilized in the medical decision making process (if labs were ordered). Portions of the record may have been created with voice recognition software.  Occasional wrong word or \"sound a like\" substitutions may have occurred due to the inherent limitations of voice recognition software.  Read the chart carefully and recognize, using context, where substitutions have occurred.        Amount and/or Complexity of Data Reviewed  Labs: ordered. Decision-making details documented in ED Course.  Radiology: ordered and independent interpretation performed.    Risk  Prescription drug management.        ED Course as of 07/18/25 1124   Fri Jul 18, 2025   0752 POC Glucose(!): 194   0754 EKG reviewed. NSR rate of 90. No signs of ischemia.   0759 Rectal temp 96.5 F. Bare hugger   0847 GLUCOSE(!): 178   0847 Total Bilirubin(!): 1.38   0847 ALK PHOS(!): 174   0847 AST: 23   0847 ALT: 14   0847 GFR, Calculated: 78   0847 Creatinine: 1.02  Similar to previous   0847 Sodium: 135   0847 Potassium: 4.9   0847 Chloride: 103   0847 Carbon Dioxide: 24   0847 ANION GAP: 8   0847 BUN: 22   0848 Ammonia(!): 17   0848 ETHANOL: <10   0848 SALICYLATE LEVEL(!): <5   0848 ACETAMINOPHEN LEVEL(!): <2   0848 hs TnI 0hr: 3   0848 BNP: 23   0848 D-Dimer, Quant: <0.27   0848 LACTIC ACID: 1.1   0848 POCT INR: 1.01   0848 WBC: 6.88   0848 " Hemoglobin: 16.9   0848 Platelet Count: 218   0848 Blood Pressure: 98/66   0848 Pulse: 93   0848 Respirations: 17   0848 SpO2: 98 %   1021 AMPH/METH(!): Positive   1045 Patient much more awake and alert now.  States that he used drugs this morning but is feeling better.  Able to tolerate p.o.       Medications   sodium chloride 0.9 % bolus 1,000 mL (0 mL Intravenous Stopped 7/18/25 0926)   ondansetron (ZOFRAN) injection 4 mg (4 mg Intravenous Given 7/18/25 0805)   iohexol (OMNIPAQUE) 350 MG/ML injection (MULTI-DOSE) 100 mL (100 mL Intravenous Given 7/18/25 0847)   lactated ringers bolus 1,000 mL (0 mL Intravenous Stopped 7/18/25 1048)       ED Risk Strat Scores                    No data recorded        SBIRT 20yo+      Flowsheet Row Most Recent Value   Initial Alcohol Screen: US AUDIT-C     1. How often do you have a drink containing alcohol? 1 Filed at: 07/18/2025 0759   2. How many drinks containing alcohol do you have on a typical day you are drinking?  1 Filed at: 07/18/2025 0759   3a. Male UNDER 65: How often do you have five or more drinks on one occasion? 0 Filed at: 07/18/2025 0759   3b. FEMALE Any Age, or MALE 65+: How often do you have 4 or more drinks on one occassion? 0 Filed at: 07/18/2025 0759   Audit-C Score 2 Filed at: 07/18/2025 0759   RENNY: How many times in the past year have you...    Used an illegal drug or used a prescription medication for non-medical reasons? Daily or Almost Daily  [Meth] Filed at: 07/18/2025 0759                            History of Present Illness       Chief Complaint   Patient presents with    Vomiting     Patient from home, reportedly n/v/d, SOB. Hx of liver CA and COPD per patient. Patient not answering many questions during triage, will not answer if he is in pain, unable to determine if oriented.       Past Medical History[1]   Past Surgical History[2]   Family History[3]   Social History[4]   E-Cigarette/Vaping    E-Cigarette Use Never User       E-Cigarette/Vaping  Substances      I have reviewed and agree with the history as documented.     This is a 60 y.o. male presenting today with concerns of abd pain. Some N/V/D. He is somewhat altered. He knows his name, that he's in the hospital and he knows the year, but does not offer more than answers to direct questioning. Some SOB. No CP.     Patient Active Problem List:     Alcoholism (HCC)     Moderate cocaine use disorder (HCC)     Bipolar I disorder, most recent episode mixed (HCC)     Bipolar I disorder, most recent episode depressed, moderate (HCC)     Insomnia     Cannabis use disorder     No-show for appointment      Allergies include:    -- Bee Venom             Review of Systems   Unable to perform ROS: Mental status change   Respiratory:  Positive for shortness of breath.    Cardiovascular:  Negative for chest pain.   Gastrointestinal:  Positive for abdominal pain, diarrhea, nausea and vomiting.   Skin:  Negative for color change and rash.   All other systems reviewed and are negative.          Objective       ED Triage Vitals   Temperature Pulse Blood Pressure Respirations SpO2 Patient Position - Orthostatic VS   07/18/25 0755 07/18/25 0755 07/18/25 0755 07/18/25 0755 07/18/25 0755 07/18/25 0755   (!) 95.6 °F (35.3 °C) 97 102/70 20 99 % Lying      Temp Source Heart Rate Source BP Location FiO2 (%) Pain Score    07/18/25 0755 07/18/25 0830 07/18/25 0755 -- --    Rectal Monitor Right arm        Vitals      Date and Time Temp Pulse SpO2 Resp BP Pain Score FACES Pain Rating User   07/18/25 1015 -- 94 95 % 19 128/78 -- --    07/18/25 0959 97.9 °F (36.6 °C) -- -- -- -- -- --    07/18/25 0945 -- 102 91 % 15 119/76 -- -- CC   07/18/25 0930 -- 94 98 % 12 96/53 -- --    07/18/25 0915 -- 93 99 % 18 111/74 -- --    07/18/25 0900 -- 101 97 % 22 109/76 -- --    07/18/25 0830 -- 93 98 % 17 98/66 -- --    07/18/25 0800 -- 94 100 % 18 91/68 -- -- CUBA   07/18/25 0755 95.6 °F (35.3 °C) 97 99 % 20 102/70 -- -- CUBA             Physical Exam  Vitals and nursing note reviewed.   Constitutional:       General: He is not in acute distress.     Appearance: He is ill-appearing.   HENT:      Head: Normocephalic and atraumatic.     Eyes:      General: No scleral icterus.     Conjunctiva/sclera: Conjunctivae normal.      Pupils: Pupils are equal, round, and reactive to light.       Cardiovascular:      Rate and Rhythm: Normal rate and regular rhythm.      Pulses: Normal pulses.      Heart sounds: Normal heart sounds. No murmur heard.     No friction rub. No gallop.   Pulmonary:      Effort: Pulmonary effort is normal. No respiratory distress.      Breath sounds: No stridor. No wheezing, rhonchi or rales.   Chest:      Chest wall: No tenderness.   Abdominal:      Tenderness: There is abdominal tenderness. There is guarding. There is no right CVA tenderness or left CVA tenderness.     Musculoskeletal:         General: Normal range of motion.      Cervical back: Normal range of motion.     Skin:     General: Skin is warm and dry.      Capillary Refill: Capillary refill takes less than 2 seconds.      Coloration: Skin is not jaundiced.     Neurological:      Mental Status: He is alert and oriented to person, place, and time.         Results Reviewed       Procedure Component Value Units Date/Time    Rapid drug screen, urine [[id number]]  (Abnormal) Collected: 07/18/25 1000    Lab Status: Final result Specimen: Urine, Other Updated: 07/18/25 1021     Amph/Meth UR Positive     Barbiturate Ur Negative     Benzodiazepine Urine Negative     Cocaine Urine Negative     Methadone Urine Negative     Opiate Urine Negative     PCP Ur Negative     THC Urine Negative     Oxycodone Urine Negative     Fentanyl Urine Negative     HYDROCODONE URINE Negative    Narrative:      FOR MEDICAL PURPOSES ONLY.   IF CONFIRMATION NEEDED PLEASE CONTACT THE LAB WITHIN 5 DAYS.    Drug Screen Cutoff Levels:  AMPHETAMINE/METHAMPHETAMINES  1000 ng/mL  BARBITURATES     200  ng/mL  BENZODIAZEPINES     200 ng/mL  COCAINE      300 ng/mL  METHADONE      300 ng/mL  OPIATES      300 ng/mL  PHENCYCLIDINE     25 ng/mL  THC       50 ng/mL  OXYCODONE      100 ng/mL  FENTANYL      5 ng/mL  HYDROCODONE     300 ng/mL    UA w Reflex to Microscopic w Reflex to Culture [[id number]] Collected: 07/18/25 1000    Lab Status: Final result Specimen: Urine, Other Updated: 07/18/25 1006     Color, UA Yellow     Clarity, UA Clear     Specific Gravity, UA 1.015     pH, UA 5.5     Leukocytes, UA Negative     Nitrite, UA Negative     Protein, UA Negative mg/dl      Glucose, UA Negative mg/dl      Ketones, UA Negative mg/dl      Urobilinogen, UA <2.0 mg/dl      Bilirubin, UA Negative     Occult Blood, UA Negative    B-Type Natriuretic Peptide(BNP) [[id number]]  (Normal) Collected: 07/18/25 0754    Lab Status: Final result Specimen: Blood from Arm, Left Updated: 07/18/25 0829     BNP 23 pg/mL     D-dimer, quantitative [[id number]]  (Normal) Collected: 07/18/25 0754    Lab Status: Final result Specimen: Blood from Arm, Left Updated: 07/18/25 0828     D-Dimer, Quant <0.27 ug/ml FEU     Narrative:      In the evaluation for possible pulmonary embolism, in the appropriate (Well's Score of 4 or less) patient, the age adjusted d-dimer cutoff for this patient can be calculated as:    Age x 0.01 (in ug/mL) for Age-adjusted D-dimer exclusion threshold for a patient over 50 years.    HS Troponin 0hr (reflex protocol) [[id number]]  (Normal) Collected: 07/18/25 0754    Lab Status: Final result Specimen: Blood from Arm, Left Updated: 07/18/25 0826     hs TnI 0hr 3 ng/L     HS Troponin I 2hr [[id number]]     Lab Status: No result Specimen: Blood     HS Troponin I 4hr [[id number]]     Lab Status: No result Specimen: Blood     Lactic acid, plasma (w/reflex if result > 2.0) [[id number]]  (Normal) Collected: 07/18/25 0754    Lab Status: Final result Specimen: Blood from Arm, Left Updated: 07/18/25 0822     LACTIC ACID 1.1 mmol/L      Narrative:      Result may be elevated if tourniquet was used during collection.    Ammonia [[id number]]  (Abnormal) Collected: 07/18/25 0754    Lab Status: Final result Specimen: Blood from Arm, Left Updated: 07/18/25 0821     Ammonia 17 umol/L     Ethanol [[id number]]  (Normal) Collected: 07/18/25 0754    Lab Status: Final result Specimen: Blood from Arm, Left Updated: 07/18/25 0821     Ethanol Lvl <10 mg/dL     Comprehensive metabolic panel [[id number]]  (Abnormal) Collected: 07/18/25 0754    Lab Status: Final result Specimen: Blood from Arm, Left Updated: 07/18/25 0821     Sodium 135 mmol/L      Potassium 4.9 mmol/L      Chloride 103 mmol/L      CO2 24 mmol/L      ANION GAP 8 mmol/L      BUN 22 mg/dL      Creatinine 1.02 mg/dL      Glucose 178 mg/dL      Calcium 9.6 mg/dL      AST 23 U/L      ALT 14 U/L      Alkaline Phosphatase 174 U/L      Total Protein 7.4 g/dL      Albumin 4.5 g/dL      Total Bilirubin 1.38 mg/dL      eGFR 78 ml/min/1.73sq m     Narrative:      National Kid[employer] guidelines for Chronic Kidney Disease (CKD):     Stage 1 with normal or high GFR (GFR > 90 mL/min/1.73 square meters)    Stage 2 Mild CKD (GFR = 60-89 mL/min/1.73 square meters)    Stage 3A Moderate CKD (GFR = 45-59 mL/min/1.73 square meters)    Stage 3B Moderate CKD (GFR = 30-44 mL/min/1.73 square meters)    Stage 4 Severe CKD (GFR = 15-29 mL/min/1.73 square meters)    Stage 5 End Stage CKD (GFR <15 mL/min/1.73 square meters)  Note: GFR calculation is accurate only with a steady state creatinine    Lipase [[id number]]  (Normal) Collected: 07/18/25 0754    Lab Status: Final result Specimen: Blood from Arm, Left Updated: 07/18/25 0821     Lipase 32 u/L     Salicylate level [[id number]]  (Abnormal) Collected: 07/18/25 0754    Lab Status: Final result Specimen: Blood from Arm, Left Updated: 07/18/25 0821     Salicylate Lvl <5 mg/dL     Acetaminophen level-If concentration is detectable, please discuss with medical   on call. [[id number]]  (Abnormal) Collected: 07/18/25 0754    Lab Status: Final result Specimen: Blood from Arm, Left Updated: 07/18/25 0821     Acetaminophen Level <2 ug/mL     APTT [[id number]]  (Normal) Collected: 07/18/25 0754    Lab Status: Final result Specimen: Blood from Arm, Left Updated: 07/18/25 0815     PTT 24 seconds     Protime-INR [[id number]]  (Normal) Collected: 07/18/25 0754    Lab Status: Final result Specimen: Blood from Arm, Left Updated: 07/18/25 0815     Protime 13.8 seconds      INR 1.01    Narrative:      INR Therapeutic Range    Indication                                             INR Range      Atrial Fibrillation                                               2.0-3.0  Hypercoagulable State                                    2.0.2.3  Left Ventricular Asist Device                            2.0-3.0  Mechanical Heart Valve                                  -    Aortic(with afib, MI, embolism, HF, LA enlargement,    and/or coagulopathy)                                     2.0-3.0 (2.5-3.5)     Mitral                                                             2.5-3.5  Prosthetic/Bioprosthetic Heart Valve               2.0-3.0  Venous thromboembolism (VTE: VT, PE        2.0-3.0    CBC and differential [[id number]]  (Abnormal) Collected: 07/18/25 0754    Lab Status: Final result Specimen: Blood from Arm, Left Updated: 07/18/25 0803     WBC 6.88 Thousand/uL      RBC 5.68 Million/uL      Hemoglobin 16.9 g/dL      Hematocrit 53.4 %      MCV 94 fL      MCH 29.8 pg      MCHC 31.6 g/dL      RDW 12.1 %      MPV 9.2 fL      Platelets 218 Thousands/uL      nRBC 0 /100 WBCs      Segmented % 87 %      Immature Grans % 1 %      Lymphocytes % 9 %      Monocytes % 3 %      Eosinophils Relative 0 %      Basophils Relative 0 %      Absolute Neutrophils 5.93 Thousands/µL      Absolute Immature Grans 0.06 Thousand/uL      Absolute Lymphocytes 0.65 Thousands/µL      Absolute Monocytes 0.21 Thousand/µL       Eosinophils Absolute 0.01 Thousand/µL      Basophils Absolute 0.02 Thousands/µL     Fingerstick Glucose (POCT) [[id number]]  (Abnormal) Collected: 07/18/25 0750    Lab Status: Final result Specimen: Blood Updated: 07/18/25 0751     POC Glucose 194 mg/dl             CT abdomen pelvis with contrast   Final Interpretation by Palak Gross MD (07/18 0947)      1. Fluid-filled loops of small and large bowel, likely diarrheal in etiology.      2. Cirrhosis. No suspicious hepatic mass.         Resident: Geno Aguirre I, the attending radiologist, have reviewed the images and agree with the final report above.      Workstation performed: [id number]         CT head without contrast   Final Interpretation by Palak Gross MD (07/18 0926)      No acute intracranial abnormality.                  Resident: Geno Aguirre I, the attending radiologist, have reviewed the images and agree with the final report above.      Workstation performed: [id number]         XR chest 1 view portable   ED Interpretation by Geoffrey Ortiz PA-C (07/18 0847)   No acute cardiopulmonary process          Procedures    ED Medication and Procedure Management   Prior to Admission Medications   Prescriptions Last Dose Informant Patient Reported? Taking?   ARIPiprazole (ABILIFY) 10 mg tablet   No No   Sig: Take 1 tablet (10 mg total) by mouth every morning   Diclofenac Sodium (VOLTAREN) 1 %   No No   Sig: Apply 2 g topically 4 (four) times a day   HYDROcodone-acetaminophen (Norco) 5-325 mg per tablet   No No   Sig: Take 1 tablet by mouth every 6 (six) hours as needed for pain Max Daily Amount: 4 tablets   albuterol (PROVENTIL HFA,VENTOLIN HFA) 90 mcg/act inhaler   No No   Sig: Inhale 1-2 puffs every 6 (six) hours as needed for wheezing   docusate sodium (COLACE) 100 mg capsule   No No   Sig: Take 1 capsule (100 mg total) by mouth every 12 (twelve) hours   famotidine (PEPCID) 20 mg tablet   No No   Sig: Take 1 tablet (20 mg  total) by mouth 2 (two) times a day   mirtazapine (REMERON) 30 mg tablet   No No   Sig: Take 1 tablet (30 mg total) by mouth daily at bedtime   ondansetron (ZOFRAN-ODT) 4 mg disintegrating tablet   No No   Sig: Take 1 tablet (4 mg total) by mouth every 6 (six) hours as needed for vomiting or nausea   traZODone (DESYREL) 100 mg tablet   No No   Sig: Take 1 tablet (100 mg total) by mouth daily at bedtime as needed for sleep      Facility-Administered Medications: None     Discharge Medication List as of 7/18/2025 10:40 AM        START taking these medications    Details   ondansetron (ZOFRAN) 4 mg tablet Take 1 tablet (4 mg total) by mouth every 6 (six) hours, Starting Fri 7/18/2025, Normal           CONTINUE these medications which have NOT CHANGED    Details   albuterol (PROVENTIL HFA,VENTOLIN HFA) 90 mcg/act inhaler Inhale 1-2 puffs every 6 (six) hours as needed for wheezing, Starting Tue 9/10/2019, Print      ARIPiprazole (ABILIFY) 10 mg tablet Take 1 tablet (10 mg total) by mouth every morning, Starting Tue 6/10/2025, Normal      Diclofenac Sodium (VOLTAREN) 1 % Apply 2 g topically 4 (four) times a day, Starting Tue 11/26/2024, Normal      docusate sodium (COLACE) 100 mg capsule Take 1 capsule (100 mg total) by mouth every 12 (twelve) hours, Starting Tue 11/26/2024, Normal      famotidine (PEPCID) 20 mg tablet Take 1 tablet (20 mg total) by mouth 2 (two) times a day, Starting Sat 2/4/2023, Normal      HYDROcodone-acetaminophen (Norco) 5-325 mg per tablet Take 1 tablet by mouth every 6 (six) hours as needed for pain Max Daily Amount: 4 tablets, Starting Tue 12/10/2024, Normal      mirtazapine (REMERON) 30 mg tablet Take 1 tablet (30 mg total) by mouth daily at bedtime, Starting Tue 6/10/2025, Normal      ondansetron (ZOFRAN-ODT) 4 mg disintegrating tablet Take 1 tablet (4 mg total) by mouth every 6 (six) hours as needed for vomiting or nausea, Starting Sat 2/4/2023, Normal      traZODone (DESYREL) 100 mg tablet  Take 1 tablet (100 mg total) by mouth daily at bedtime as needed for sleep, Starting Tue 6/10/2025, Normal           No discharge procedures on file.  ED SEPSIS DOCUMENTATION   Time reflects when diagnosis was documented in both MDM as applicable and the Disposition within this note       Time User Action Codes Description Comment    7/18/2025  9:49 AM Geoffrey Ortiz [K74.60] Cirrhosis of liver (HCC)     7/18/2025  9:50 AM Geoffrey Ortiz [R19.7] Diarrhea     7/18/2025 10:38 AM Geoffrey Ortiz [R73.9] Hyperglycemia     7/18/2025 10:39 AM Geoffrey Ortiz [R11.2] Nausea and vomiting     7/18/2025 11:24 AM Geoffrey Ortiz [G92.9] Toxic encephalopathy                      [1]   Past Medical History:  Diagnosis Date    Schwartz's esophagus     Bipolar disorder (HCC)     Cirrhosis (HCC)     COPD (chronic obstructive pulmonary disease) (HCC)     Hepatitis C     Psoriasis     Psychiatric disorder     bipolar    Rheumatoid arthritis (HCC)     Salmonella     Substance abuse (HCC)     ETOH and IVDA   [2]   Past Surgical History:  Procedure Laterality Date    ANKLE SURGERY      CARPAL TUNNEL RELEASE      CHOLECYSTECTOMY      COLONOSCOPY  10/31/2017    EGD  09/12/2014   [3]   Family History  Problem Relation Name Age of Onset    Lung cancer Mother      Parkinsonism Father      Hepatitis Brother          C   [4]   Social History  Tobacco Use    Smoking status: Former    Smokeless tobacco: Never   Vaping Use    Vaping status: Never Used   Substance Use Topics    Alcohol use: Not Currently     Comment: Beer    Drug use: Yes     Types: Methamphetamines, Marijuana, Cocaine        Geoffrey Ortiz PA-C  07/18/25 1124